# Patient Record
Sex: FEMALE | Race: WHITE | NOT HISPANIC OR LATINO | Employment: UNEMPLOYED | URBAN - METROPOLITAN AREA
[De-identification: names, ages, dates, MRNs, and addresses within clinical notes are randomized per-mention and may not be internally consistent; named-entity substitution may affect disease eponyms.]

---

## 2018-11-14 ENCOUNTER — TRANSCRIBE ORDERS (OUTPATIENT)
Dept: LAB | Facility: HOSPITAL | Age: 33
End: 2018-11-14

## 2018-11-14 ENCOUNTER — LAB (OUTPATIENT)
Dept: LAB | Facility: HOSPITAL | Age: 33
End: 2018-11-14

## 2018-11-14 DIAGNOSIS — Z32.00 PREGNANCY EXAMINATION OR TEST, PREGNANCY UNCONFIRMED: Primary | ICD-10-CM

## 2018-11-14 DIAGNOSIS — Z32.00 PREGNANCY EXAMINATION OR TEST, PREGNANCY UNCONFIRMED: ICD-10-CM

## 2018-11-14 LAB
ABO GROUP BLD: NORMAL
BASOPHILS # BLD AUTO: 0.04 10*3/MM3 (ref 0–0.2)
BASOPHILS NFR BLD AUTO: 0.3 % (ref 0–1)
BLD GP AB SCN SERPL QL: NEGATIVE
DEPRECATED RDW RBC AUTO: 44.4 FL (ref 37–54)
EOSINOPHIL # BLD AUTO: 0.08 10*3/MM3 (ref 0–0.3)
EOSINOPHIL NFR BLD AUTO: 0.6 % (ref 0–3)
ERYTHROCYTE [DISTWIDTH] IN BLOOD BY AUTOMATED COUNT: 13.6 % (ref 11.3–14.5)
GLUCOSE BLD-MCNC: 93 MG/DL (ref 70–100)
HCT VFR BLD AUTO: 40.7 % (ref 34.5–44)
HGB BLD-MCNC: 12.9 G/DL (ref 11.5–15.5)
IMM GRANULOCYTES # BLD: 0.03 10*3/MM3 (ref 0–0.03)
IMM GRANULOCYTES NFR BLD: 0.2 % (ref 0–0.6)
LYMPHOCYTES # BLD AUTO: 2.38 10*3/MM3 (ref 0.6–4.8)
LYMPHOCYTES NFR BLD AUTO: 19.3 % (ref 24–44)
MCH RBC QN AUTO: 28.4 PG (ref 27–31)
MCHC RBC AUTO-ENTMCNC: 31.7 G/DL (ref 32–36)
MCV RBC AUTO: 89.5 FL (ref 80–99)
MONOCYTES # BLD AUTO: 0.9 10*3/MM3 (ref 0–1)
MONOCYTES NFR BLD AUTO: 7.3 % (ref 0–12)
NEUTROPHILS # BLD AUTO: 8.89 10*3/MM3 (ref 1.5–8.3)
NEUTROPHILS NFR BLD AUTO: 72.3 % (ref 41–71)
PLATELET # BLD AUTO: 338 10*3/MM3 (ref 150–450)
PMV BLD AUTO: 9.9 FL (ref 6–12)
RBC # BLD AUTO: 4.55 10*6/MM3 (ref 3.89–5.14)
RH BLD: POSITIVE
WBC NRBC COR # BLD: 12.32 10*3/MM3 (ref 3.5–10.8)

## 2018-11-14 PROCEDURE — 86901 BLOOD TYPING SEROLOGIC RH(D): CPT

## 2018-11-14 PROCEDURE — 87522 HEPATITIS C REVRS TRNSCRPJ: CPT

## 2018-11-14 PROCEDURE — 86803 HEPATITIS C AB TEST: CPT

## 2018-11-14 PROCEDURE — 86850 RBC ANTIBODY SCREEN: CPT

## 2018-11-14 PROCEDURE — 85025 COMPLETE CBC W/AUTO DIFF WBC: CPT

## 2018-11-14 PROCEDURE — 82947 ASSAY GLUCOSE BLOOD QUANT: CPT | Performed by: OBSTETRICS & GYNECOLOGY

## 2018-11-14 PROCEDURE — 80081 OBSTETRIC PANEL INC HIV TSTG: CPT

## 2018-11-14 PROCEDURE — 86900 BLOOD TYPING SEROLOGIC ABO: CPT

## 2018-11-14 PROCEDURE — 36415 COLL VENOUS BLD VENIPUNCTURE: CPT | Performed by: OBSTETRICS & GYNECOLOGY

## 2018-11-15 LAB
HBV SURFACE AG SERPL QL IA: NORMAL
HCV AB SER DONR QL: REACTIVE
HIV1+2 AB SER QL: NORMAL
RUBV IGG SER QL: NORMAL
RUBV IGG SER-ACNC: 42.8 IU/ML

## 2018-11-16 LAB — RPR SER QL: NORMAL

## 2018-11-18 LAB
HCV RNA SERPL NAA+PROBE-ACNC: NORMAL IU/ML
TEST INFORMATION: NORMAL

## 2019-04-03 ENCOUNTER — LAB (OUTPATIENT)
Dept: LAB | Facility: HOSPITAL | Age: 34
End: 2019-04-03

## 2019-04-03 ENCOUNTER — TRANSCRIBE ORDERS (OUTPATIENT)
Dept: LAB | Facility: HOSPITAL | Age: 34
End: 2019-04-03

## 2019-04-03 DIAGNOSIS — Z34.83 PRENATAL CARE, SUBSEQUENT PREGNANCY, THIRD TRIMESTER: Primary | ICD-10-CM

## 2019-04-03 DIAGNOSIS — Z3A.28 28 WEEKS GESTATION OF PREGNANCY: ICD-10-CM

## 2019-04-03 DIAGNOSIS — Z34.83 PRENATAL CARE, SUBSEQUENT PREGNANCY, THIRD TRIMESTER: ICD-10-CM

## 2019-04-03 LAB
AMPHET+METHAMPHET UR QL: NEGATIVE
AMPHETAMINES UR QL: NEGATIVE
BARBITURATES UR QL SCN: NEGATIVE
BENZODIAZ UR QL SCN: NEGATIVE
BLD GP AB SCN SERPL QL: NEGATIVE
BUPRENORPHINE SERPL-MCNC: NEGATIVE NG/ML
CANNABINOIDS SERPL QL: NEGATIVE
COCAINE UR QL: NEGATIVE
DEPRECATED RDW RBC AUTO: 43.3 FL (ref 37–54)
ERYTHROCYTE [DISTWIDTH] IN BLOOD BY AUTOMATED COUNT: 13.6 % (ref 11.3–14.5)
GLUCOSE 1H P 100 G GLC PO SERPL-MCNC: 157 MG/DL (ref 65–140)
HCT VFR BLD AUTO: 34.5 % (ref 34.5–44)
HGB BLD-MCNC: 10.9 G/DL (ref 11.5–15.5)
MCH RBC QN AUTO: 27.5 PG (ref 27–31)
MCHC RBC AUTO-ENTMCNC: 31.6 G/DL (ref 32–36)
MCV RBC AUTO: 86.9 FL (ref 80–99)
METHADONE UR QL SCN: NEGATIVE
OPIATES UR QL: NEGATIVE
OXYCODONE UR QL SCN: NEGATIVE
PCP UR QL SCN: NEGATIVE
PLATELET # BLD AUTO: 309 10*3/MM3 (ref 150–450)
PMV BLD AUTO: 10.4 FL (ref 6–12)
PROPOXYPH UR QL: NEGATIVE
RBC # BLD AUTO: 3.97 10*6/MM3 (ref 3.89–5.14)
TRICYCLICS UR QL SCN: NEGATIVE
WBC NRBC COR # BLD: 11.31 10*3/MM3 (ref 3.5–10.8)

## 2019-04-03 PROCEDURE — 82950 GLUCOSE TEST: CPT | Performed by: OBSTETRICS & GYNECOLOGY

## 2019-04-03 PROCEDURE — 80306 DRUG TEST PRSMV INSTRMNT: CPT

## 2019-04-03 PROCEDURE — 36415 COLL VENOUS BLD VENIPUNCTURE: CPT | Performed by: OBSTETRICS & GYNECOLOGY

## 2019-04-03 PROCEDURE — 85027 COMPLETE CBC AUTOMATED: CPT | Performed by: OBSTETRICS & GYNECOLOGY

## 2019-04-03 PROCEDURE — 86850 RBC ANTIBODY SCREEN: CPT

## 2019-05-08 ENCOUNTER — OFFICE VISIT (OUTPATIENT)
Dept: ENDOCRINOLOGY | Facility: CLINIC | Age: 34
End: 2019-05-08

## 2019-05-08 VITALS
SYSTOLIC BLOOD PRESSURE: 132 MMHG | DIASTOLIC BLOOD PRESSURE: 78 MMHG | WEIGHT: 266.8 LBS | OXYGEN SATURATION: 99 % | HEART RATE: 82 BPM

## 2019-05-08 DIAGNOSIS — O24.419 GESTATIONAL DIABETES MELLITUS (GDM) IN THIRD TRIMESTER, GESTATIONAL DIABETES METHOD OF CONTROL UNSPECIFIED: Primary | ICD-10-CM

## 2019-05-08 LAB
GLUCOSE BLDC GLUCOMTR-MCNC: 127 MG/DL (ref 70–130)
HBA1C MFR BLD: 5.7 %

## 2019-05-08 PROCEDURE — 99243 OFF/OP CNSLTJ NEW/EST LOW 30: CPT | Performed by: INTERNAL MEDICINE

## 2019-05-08 PROCEDURE — 82947 ASSAY GLUCOSE BLOOD QUANT: CPT | Performed by: INTERNAL MEDICINE

## 2019-05-08 PROCEDURE — 83036 HEMOGLOBIN GLYCOSYLATED A1C: CPT | Performed by: INTERNAL MEDICINE

## 2019-05-08 RX ORDER — LANCETS
EACH MISCELLANEOUS
Qty: 100 EACH | Refills: 5 | Status: SHIPPED | OUTPATIENT
Start: 2019-05-08

## 2019-05-08 RX ORDER — RANITIDINE 150 MG/1
150 TABLET ORAL 2 TIMES DAILY
Refills: 0 | COMMUNITY
Start: 2019-04-15 | End: 2019-06-19 | Stop reason: HOSPADM

## 2019-05-08 RX ORDER — ONDANSETRON 8 MG/1
8 TABLET, ORALLY DISINTEGRATING ORAL EVERY 6 HOURS PRN
Refills: 0 | COMMUNITY
Start: 2019-03-17 | End: 2019-06-19 | Stop reason: HOSPADM

## 2019-05-08 RX ORDER — CITALOPRAM 20 MG/1
20 TABLET ORAL DAILY
Refills: 0 | COMMUNITY
Start: 2019-04-17

## 2019-05-08 RX ORDER — ALBUTEROL SULFATE 90 UG/1
AEROSOL, METERED RESPIRATORY (INHALATION)
Refills: 0 | COMMUNITY
Start: 2019-03-27 | End: 2019-05-08

## 2019-05-08 NOTE — ASSESSMENT & PLAN NOTE
She will begin testing sugars fasting and 2 hours after meals and will fax blood sugars weekly to [email or web address]  We will plan to see her back in 3-4 weeks, or sooner if problems or questions.  Thank you for this referral and please do not hesitate to call for any problems or questions.

## 2019-05-08 NOTE — PROGRESS NOTES
Fatuma Barnes 34 y.o.  CC:  Chief Complaint   Patient presents with   • Establish Care     The patient is being seen at the request of LUIS Sanderson for a consultation regarding gestational diabetes. ASHLEY:2019     [place]: Establish Care (The patient is being seen at the request of LUIS Sanderson for a consultation regarding gestational diabetes. ASHLEY:2019)  blood sugars reviewed  Results for orders placed or performed in visit on 19   POC Glycosylated Hemoglobin (Hb A1C)   Result Value Ref Range    Hemoglobin A1C 5.7 %   POCT Glucose   Result Value Ref Range    Glucose 127 70 - 130 mg/dL     Gtt reviewed with patient.   Average sugar is marginal and possibility of prediabetes discussed  Her  is diabetic as well  Great grandparents + h/o diabetes   Is  with GDM with middle child as well ( 5 lb 6 oz delivered at 37 weeks)  She is currently 33 weeks pregnant   We discussed the diagnosis of gestational diabetes and reviewed her glucose levels/ glucose tolerance test.  We discussed the concept of carbohydrate awaredness and carbohydrate content of meals was discussed.  Impact of sweets and other carb containing foods and types of foods typically high in carbohydrates was discussed. Overall guidelines for meal planning related to specific carbohydrate content of meals was discussed and she was provided recommendations about carbohydrates recommended with meals and with snacks.  She was asked to give up any sugared drinks, and avoid breakfast cereal.  Blood sugar testing fasting and after each meal was reviewed and the patient was taught to use a glucometer to test her blood sugar.  Normal values for blood sugar monitoring were discussed as well, with fasting level less than 95, 1 hour pp blood sugar less than 140 and 2 hour blood sugar less than 120 recommended.  Risks related to poor control of blood sugars during pregnancy were discussed with a focus on specific risks to both her and the baby.   Risks discussed include macrosomia (large birthweight), fetal lung immaturity with respiratory distress and low oxygen level, stillbirth, low blood sugar after delivery, high bilirubin/jaundice, and hypocalcemia.  Use of medications during pregnancy (eg metformin, glyburide and insulin) was discussed.  Her long term risks (outside of pregnancy) for development of Diabetes Mellitus were reviewed and we discussed the importance of healthy lifestyle now and in the future to help reduce the risk of progression to diabetes.      Allergies   Allergen Reactions   • Penicillins Rash       Current Outpatient Medications:   •  glucose blood (ONETOUCH VERIO) test strip, One strip 4 times daily to check blood sugars, Disp: 200 each, Rfl: 5  •  Lancets (ONETOUCH ULTRASOFT) lancets, Use to check blood sugars 4 times daily, Disp: 100 each, Rfl: 5  •  ondansetron ODT (ZOFRAN-ODT) 8 MG disintegrating tablet, , Disp: , Rfl: 0  •  raNITIdine (ZANTAC) 150 MG tablet, , Disp: , Rfl: 0  •  Urine Glucose-Ketones Test strip, 1 strip by In Vitro route Daily., Disp: 30 each, Rfl: 3  •  citalopram (CeleXA) 20 MG tablet, Take 20 mg by mouth Daily., Disp: , Rfl: 0  There are no active problems to display for this patient.    Review of Systems   Constitutional: Positive for fatigue. Negative for activity change, appetite change, chills, diaphoresis, fever and unexpected weight change.   HENT: Negative for congestion, dental problem, drooling, ear discharge, ear pain, facial swelling, hearing loss, mouth sores, nosebleeds, postnasal drip, rhinorrhea, sinus pressure, sneezing, sore throat, tinnitus, trouble swallowing and voice change.    Eyes: Negative for photophobia, pain, discharge, redness, itching and visual disturbance.   Respiratory: Negative for apnea, cough, choking, chest tightness, shortness of breath, wheezing and stridor.    Cardiovascular: Negative for chest pain, palpitations and leg swelling.   Gastrointestinal: Negative for  abdominal distention, abdominal pain, anal bleeding, blood in stool, constipation, diarrhea, nausea, rectal pain and vomiting.        Gerd   Endocrine: Positive for heat intolerance and polydipsia. Negative for cold intolerance, polyphagia and polyuria.   Genitourinary: Negative for decreased urine volume, difficulty urinating, dysuria, enuresis, flank pain, frequency, genital sores, hematuria and urgency.   Musculoskeletal: Negative for arthralgias, back pain, gait problem, joint swelling, myalgias, neck pain and neck stiffness.   Skin: Negative for color change, pallor, rash and wound.   Allergic/Immunologic: Negative for environmental allergies, food allergies and immunocompromised state.   Neurological: Positive for weakness. Negative for dizziness, tremors, seizures, syncope, facial asymmetry, speech difficulty, light-headedness, numbness and headaches.   Hematological: Negative for adenopathy. Does not bruise/bleed easily.   Psychiatric/Behavioral: Negative for agitation, behavioral problems, confusion, decreased concentration, dysphoric mood, hallucinations, self-injury, sleep disturbance and suicidal ideas. The patient is not nervous/anxious and is not hyperactive.      Social History     Socioeconomic History   • Marital status:      Spouse name: Not on file   • Number of children: Not on file   • Years of education: Not on file   • Highest education level: Not on file   Tobacco Use   • Smoking status: Light Tobacco Smoker   • Smokeless tobacco: Never Used   Substance and Sexual Activity   • Alcohol use: No     Frequency: Never   • Drug use: No   • Sexual activity: Yes     Partners: Male     Birth control/protection: None     Family History   Problem Relation Age of Onset   • Hypertension Mother    • Hypertension Father      /78   Pulse 82   Wt 121 kg (266 lb 12.8 oz)   SpO2 99%   Breastfeeding? No   Physical Exam   Constitutional: She is oriented to person, place, and time. She appears  well-developed and well-nourished.   HENT:   Head: Normocephalic and atraumatic.   Nose: Nose normal.   Mouth/Throat: Oropharynx is clear and moist.   Eyes: Conjunctivae, EOM and lids are normal. Pupils are equal, round, and reactive to light.   Neck: Trachea normal and normal range of motion. Neck supple. Carotid bruit is not present. No tracheal deviation present. Thyromegaly present. No thyroid mass present.   Cardiovascular: Normal rate, regular rhythm, normal heart sounds and intact distal pulses. Exam reveals no gallop and no friction rub.   No murmur heard.  Pulmonary/Chest: Effort normal and breath sounds normal. No respiratory distress. She has no wheezes.   Musculoskeletal: Normal range of motion. She exhibits no edema or deformity.   Lymphadenopathy:     She has no cervical adenopathy.   Neurological: She is alert and oriented to person, place, and time. She has normal reflexes. She displays normal reflexes. No cranial nerve deficit.   Skin: Skin is warm and dry. No rash noted. No cyanosis or erythema. Nails show no clubbing.   Psychiatric: She has a normal mood and affect. Her speech is normal and behavior is normal. Judgment and thought content normal. Cognition and memory are normal.   Nursing note and vitals reviewed.    Results for orders placed or performed in visit on 04/03/19   Urine Drug Screen - Urine, Clean Catch   Result Value Ref Range    THC, Screen, Urine Negative Negative    Phencyclidine (PCP), Urine Negative Negative    Cocaine Screen, Urine Negative Negative    Methamphetamine, Ur Negative Negative    Opiate Screen Negative Negative    Amphetamine Screen, Urine Negative Negative    Benzodiazepine Screen, Urine Negative Negative    Tricyclic Antidepressants Screen Negative Negative    Methadone Screen, Urine Negative Negative    Barbiturates Screen, Urine Negative Negative    Oxycodone Screen, Urine Negative Negative    Propoxyphene Screen Negative Negative    Buprenorphine, Screen,  Urine Negative Negative   Antibody Screen   Result Value Ref Range    Antibody Screen Negative      Fatmua was seen today for establish care.    Diagnoses and all orders for this visit:    Gestational diabetes mellitus (GDM) in third trimester, gestational diabetes method of control unspecified  -     POC Glycosylated Hemoglobin (Hb A1C)  -     POCT Glucose    Other orders  -     Lancets (ONETOUCH ULTRASOFT) lancets; Use to check blood sugars 4 times daily  -     glucose blood (ONETOUCH VERIO) test strip; One strip 4 times daily to check blood sugars  -     Urine Glucose-Ketones Test strip; 1 strip by In Vitro route Daily.      Problem List Items Addressed This Visit        Endocrine    Gestational diabetes mellitus (GDM) in third trimester - Primary     She will begin testing sugars fasting and 2 hours after meals and will fax blood sugars weekly to [email or web address].  We will plan to see her back in 3-4 weeks, or sooner if problems or questions.  Thank you for this referral and please do not hesitate to call for any problems or questions.           Relevant Orders    POC Glycosylated Hemoglobin (Hb A1C) (Completed)    POCT Glucose (Completed)        Return in about 4 weeks (around 6/5/2019) for Recheck 30 min .    Shari Hollingsworth  Signed Claudia Mota MD

## 2019-05-15 ENCOUNTER — LAB (OUTPATIENT)
Dept: LAB | Facility: HOSPITAL | Age: 34
End: 2019-05-15

## 2019-05-15 ENCOUNTER — TELEPHONE (OUTPATIENT)
Dept: INTERNAL MEDICINE | Facility: CLINIC | Age: 34
End: 2019-05-15

## 2019-05-15 ENCOUNTER — TRANSCRIBE ORDERS (OUTPATIENT)
Dept: LAB | Facility: HOSPITAL | Age: 34
End: 2019-05-15

## 2019-05-15 DIAGNOSIS — O26.613 LIVER DISORDER IN PREGNANCY IN THIRD TRIMESTER: ICD-10-CM

## 2019-05-15 DIAGNOSIS — O26.613 LIVER DISORDER IN PREGNANCY IN THIRD TRIMESTER: Primary | ICD-10-CM

## 2019-05-15 LAB
ALP SERPL-CCNC: 145 U/L (ref 39–117)
ALT SERPL W P-5'-P-CCNC: 13 U/L (ref 1–33)
AST SERPL-CCNC: 14 U/L (ref 1–32)
BILIRUB SERPL-MCNC: <0.2 MG/DL (ref 0.2–1.2)
CREAT BLD-MCNC: 0.55 MG/DL (ref 0.57–1)
LDH SERPL-CCNC: 156 U/L (ref 135–214)
URATE SERPL-MCNC: 3.7 MG/DL (ref 2.4–5.7)

## 2019-05-15 PROCEDURE — 84075 ASSAY ALKALINE PHOSPHATASE: CPT | Performed by: ADVANCED PRACTICE MIDWIFE

## 2019-05-15 PROCEDURE — 36415 COLL VENOUS BLD VENIPUNCTURE: CPT | Performed by: ADVANCED PRACTICE MIDWIFE

## 2019-05-15 PROCEDURE — 82247 BILIRUBIN TOTAL: CPT | Performed by: ADVANCED PRACTICE MIDWIFE

## 2019-05-15 PROCEDURE — 82565 ASSAY OF CREATININE: CPT | Performed by: ADVANCED PRACTICE MIDWIFE

## 2019-05-15 PROCEDURE — 82239 BILE ACIDS TOTAL: CPT

## 2019-05-15 PROCEDURE — 84550 ASSAY OF BLOOD/URIC ACID: CPT | Performed by: ADVANCED PRACTICE MIDWIFE

## 2019-05-15 PROCEDURE — 84460 ALANINE AMINO (ALT) (SGPT): CPT | Performed by: ADVANCED PRACTICE MIDWIFE

## 2019-05-15 PROCEDURE — 84450 TRANSFERASE (AST) (SGOT): CPT | Performed by: ADVANCED PRACTICE MIDWIFE

## 2019-05-15 PROCEDURE — 83615 LACTATE (LD) (LDH) ENZYME: CPT | Performed by: ADVANCED PRACTICE MIDWIFE

## 2019-05-16 ENCOUNTER — TELEPHONE (OUTPATIENT)
Dept: INTERNAL MEDICINE | Facility: CLINIC | Age: 34
End: 2019-05-16

## 2019-05-16 NOTE — TELEPHONE ENCOUNTER
Pt called stating sugar levels, pt states she is 8 months pregnant. Please give pt a call [phone]

## 2019-05-17 ENCOUNTER — TELEPHONE (OUTPATIENT)
Dept: ENDOCRINOLOGY | Facility: CLINIC | Age: 34
End: 2019-05-17

## 2019-05-17 LAB — BILE AC SERPL-SCNC: 6.7 UMOL/L (ref 4.7–24.5)

## 2019-05-17 NOTE — TELEPHONE ENCOUNTER
Tried calling patient several times with no success will call POC and  attempt to call again later. Pt VM is not set up / no answer.

## 2019-05-17 NOTE — TELEPHONE ENCOUNTER
Dr Mota will email the patient and ask for updated blood sugars and will address medications if needed

## 2019-05-21 ENCOUNTER — TELEPHONE (OUTPATIENT)
Dept: ENDOCRINOLOGY | Facility: CLINIC | Age: 34
End: 2019-05-21

## 2019-05-21 NOTE — TELEPHONE ENCOUNTER
From Email- insulin start please call   Juanito Burris,   I think we need to start some insulin.  I have put a coupon for NPH insulin and samples of novolog for you to .  You will start 8 units of NPH insulin at bedtime and 4 units of novolog before each meal  You will need to buy a meter (I would get a [employer] or [employer] meter, they will be 15$ and the strips will be about 20-25$ a month)  Are you able to come by and pick this up today or tomorrow?  Thanks,     Claudia Mota MD  [facility] at [place]  [phone]  [secure email]    From: Fatuma Barnes <[email or web address]>   Sent: Monday, May 20, 2019 7:43 AM  To: Claudia Mota ([facility]) (MD) <[email or web address]>  Subject: Fatuma Barnes GD numbers    CAUTION: This email originated from outside of the organization. Do not click links or open attachments unless you recognize the sender and know the content is safe.    Good morning . I am sorry this is late coming in. I had a rough day yesterday and fell asleep early.     I have changed my diet the best that I can. I cut out pop and sweet drinks and when I do have carbs I try to make sure they are whole grain. My  lost his job and thing are tight right now, so I'm trying to do the best that I can on a small budget.    Also, I have started walking as well. Friday I had to  Lancets and test strips from our pharmacy that goes to my 's glucose meter. Insurance would not cover for the meter that was given to me at your office .That's why Friday is blank.    I have gotten a bit frustrated over my numbers being high. I have tried to call your office and speak to a nurse 3 days in a row with no luck.    Lastly, I might have Cholestasis. I had it with my 2 previous pregnancies. I'm not sure if that has anything to do with my numbers, but I was just wanting to let you know.    Thank you and have a good week !

## 2019-05-31 ENCOUNTER — HOSPITAL ENCOUNTER (OUTPATIENT)
Facility: HOSPITAL | Age: 34
Discharge: HOME | End: 2019-05-31
Attending: OBSTETRICS & GYNECOLOGY | Admitting: OBSTETRICS & GYNECOLOGY

## 2019-05-31 ENCOUNTER — TELEPHONE (OUTPATIENT)
Dept: INTERNAL MEDICINE | Facility: CLINIC | Age: 34
End: 2019-05-31

## 2019-05-31 VITALS
WEIGHT: 267 LBS | DIASTOLIC BLOOD PRESSURE: 78 MMHG | TEMPERATURE: 98.4 F | HEIGHT: 68 IN | BODY MASS INDEX: 40.47 KG/M2 | HEART RATE: 93 BPM | SYSTOLIC BLOOD PRESSURE: 131 MMHG | RESPIRATION RATE: 18 BRPM

## 2019-05-31 PROCEDURE — 59025 FETAL NON-STRESS TEST: CPT

## 2019-05-31 PROCEDURE — 59025 FETAL NON-STRESS TEST: CPT | Performed by: OBSTETRICS & GYNECOLOGY

## 2019-05-31 PROCEDURE — 99202 OFFICE O/P NEW SF 15 MIN: CPT | Performed by: OBSTETRICS & GYNECOLOGY

## 2019-05-31 PROCEDURE — G0463 HOSPITAL OUTPT CLINIC VISIT: HCPCS

## 2019-06-14 ENCOUNTER — PREP FOR SURGERY (OUTPATIENT)
Dept: OTHER | Facility: HOSPITAL | Age: 34
End: 2019-06-14

## 2019-06-14 DIAGNOSIS — Z3A.39 39 WEEKS GESTATION OF PREGNANCY: Primary | ICD-10-CM

## 2019-06-14 RX ORDER — PENICILLIN G 3000000 [IU]/50ML
3 INJECTION, SOLUTION INTRAVENOUS EVERY 4 HOURS
Status: CANCELLED | OUTPATIENT
Start: 2019-06-14 | End: 2019-06-18

## 2019-06-14 RX ORDER — ONDANSETRON 4 MG/1
4 TABLET, FILM COATED ORAL EVERY 6 HOURS PRN
Status: CANCELLED | OUTPATIENT
Start: 2019-06-14

## 2019-06-14 RX ORDER — LIDOCAINE HYDROCHLORIDE 10 MG/ML
5 INJECTION, SOLUTION EPIDURAL; INFILTRATION; INTRACAUDAL; PERINEURAL AS NEEDED
Status: CANCELLED | OUTPATIENT
Start: 2019-06-14

## 2019-06-14 RX ORDER — SODIUM CHLORIDE 0.9 % (FLUSH) 0.9 %
3 SYRINGE (ML) INJECTION EVERY 12 HOURS SCHEDULED
Status: CANCELLED | OUTPATIENT
Start: 2019-06-14

## 2019-06-14 RX ORDER — OXYTOCIN-SODIUM CHLORIDE 0.9% IV SOLN 30 UNIT/500ML 30-0.9/5 UT/ML-%
85 SOLUTION INTRAVENOUS ONCE
Status: CANCELLED | OUTPATIENT
Start: 2019-06-14 | End: 2019-06-14

## 2019-06-14 RX ORDER — OXYTOCIN-SODIUM CHLORIDE 0.9% IV SOLN 30 UNIT/500ML 30-0.9/5 UT/ML-%
650 SOLUTION INTRAVENOUS ONCE
Status: CANCELLED | OUTPATIENT
Start: 2019-06-14 | End: 2019-06-14

## 2019-06-14 RX ORDER — ACETAMINOPHEN 325 MG/1
650 TABLET ORAL EVERY 4 HOURS PRN
Status: CANCELLED | OUTPATIENT
Start: 2019-06-14

## 2019-06-14 RX ORDER — MISOPROSTOL 200 UG/1
800 TABLET ORAL AS NEEDED
Status: CANCELLED | OUTPATIENT
Start: 2019-06-14

## 2019-06-14 RX ORDER — SODIUM CHLORIDE 0.9 % (FLUSH) 0.9 %
1-10 SYRINGE (ML) INJECTION AS NEEDED
Status: CANCELLED | OUTPATIENT
Start: 2019-06-14

## 2019-06-14 RX ORDER — BUTORPHANOL TARTRATE 1 MG/ML
1 INJECTION, SOLUTION INTRAMUSCULAR; INTRAVENOUS
Status: CANCELLED | OUTPATIENT
Start: 2019-06-14

## 2019-06-14 RX ORDER — IBUPROFEN 600 MG/1
600 TABLET ORAL EVERY 6 HOURS PRN
Status: CANCELLED | OUTPATIENT
Start: 2019-06-14

## 2019-06-14 RX ORDER — HYDROCODONE BITARTRATE AND ACETAMINOPHEN 5; 325 MG/1; MG/1
2 TABLET ORAL EVERY 4 HOURS PRN
Status: CANCELLED | OUTPATIENT
Start: 2019-06-14 | End: 2019-06-24

## 2019-06-14 RX ORDER — MAGNESIUM CARB/ALUMINUM HYDROX 105-160MG
30 TABLET,CHEWABLE ORAL ONCE
Status: CANCELLED | OUTPATIENT
Start: 2019-06-14 | End: 2019-06-14

## 2019-06-14 RX ORDER — CARBOPROST TROMETHAMINE 250 UG/ML
250 INJECTION, SOLUTION INTRAMUSCULAR AS NEEDED
Status: CANCELLED | OUTPATIENT
Start: 2019-06-14

## 2019-06-14 RX ORDER — ONDANSETRON 2 MG/ML
4 INJECTION INTRAMUSCULAR; INTRAVENOUS EVERY 6 HOURS PRN
Status: CANCELLED | OUTPATIENT
Start: 2019-06-14

## 2019-06-14 RX ORDER — METHYLERGONOVINE MALEATE 0.2 MG/ML
200 INJECTION INTRAVENOUS ONCE AS NEEDED
Status: CANCELLED | OUTPATIENT
Start: 2019-06-14

## 2019-06-14 RX ORDER — SODIUM CHLORIDE, SODIUM LACTATE, POTASSIUM CHLORIDE, CALCIUM CHLORIDE 600; 310; 30; 20 MG/100ML; MG/100ML; MG/100ML; MG/100ML
125 INJECTION, SOLUTION INTRAVENOUS CONTINUOUS
Status: CANCELLED | OUTPATIENT
Start: 2019-06-14

## 2019-06-16 ENCOUNTER — HOSPITAL ENCOUNTER (INPATIENT)
Dept: LABOR AND DELIVERY | Facility: HOSPITAL | Age: 34
LOS: 3 days | Discharge: HOME | End: 2019-06-19
Attending: OBSTETRICS & GYNECOLOGY | Admitting: OBSTETRICS & GYNECOLOGY

## 2019-06-16 PROBLEM — Z34.90 PATIENT CURRENTLY PREGNANT: Status: ACTIVE | Noted: 2019-06-16

## 2019-06-16 LAB
ABO GROUP BLD: NORMAL
ALP SERPL-CCNC: 317 U/L (ref 39–117)
ALT SERPL W P-5'-P-CCNC: 10 U/L (ref 1–33)
AST SERPL-CCNC: 16 U/L (ref 1–32)
BILIRUB SERPL-MCNC: 0.2 MG/DL (ref 0.2–1.2)
BLD GP AB SCN SERPL QL: NEGATIVE
CREAT BLD-MCNC: 0.64 MG/DL (ref 0.57–1)
DEPRECATED RDW RBC AUTO: 40.9 FL (ref 37–54)
ERYTHROCYTE [DISTWIDTH] IN BLOOD BY AUTOMATED COUNT: 14 % (ref 12.3–15.4)
GLUCOSE BLDC GLUCOMTR-MCNC: 135 MG/DL (ref 70–130)
GLUCOSE BLDC GLUCOMTR-MCNC: 72 MG/DL (ref 70–130)
GLUCOSE BLDC GLUCOMTR-MCNC: 77 MG/DL (ref 70–130)
HCT VFR BLD AUTO: 34.1 % (ref 34–46.6)
HGB BLD-MCNC: 10.5 G/DL (ref 12–15.9)
LDH SERPL-CCNC: 138 U/L (ref 135–214)
MCH RBC QN AUTO: 25.2 PG (ref 26.6–33)
MCHC RBC AUTO-ENTMCNC: 30.8 G/DL (ref 31.5–35.7)
MCV RBC AUTO: 81.8 FL (ref 79–97)
PLATELET # BLD AUTO: 252 10*3/MM3 (ref 140–450)
PMV BLD AUTO: 11.7 FL (ref 6–12)
RBC # BLD AUTO: 4.17 10*6/MM3 (ref 3.77–5.28)
RH BLD: POSITIVE
T&S EXPIRATION DATE: NORMAL
URATE SERPL-MCNC: 5.7 MG/DL (ref 2.4–5.7)
WBC NRBC COR # BLD: 12.04 10*3/MM3 (ref 3.4–10.8)

## 2019-06-16 PROCEDURE — 82247 BILIRUBIN TOTAL: CPT | Performed by: NURSE PRACTITIONER

## 2019-06-16 PROCEDURE — 84075 ASSAY ALKALINE PHOSPHATASE: CPT | Performed by: NURSE PRACTITIONER

## 2019-06-16 PROCEDURE — 86901 BLOOD TYPING SEROLOGIC RH(D): CPT | Performed by: NURSE PRACTITIONER

## 2019-06-16 PROCEDURE — 82962 GLUCOSE BLOOD TEST: CPT

## 2019-06-16 PROCEDURE — 25010000002 ONDANSETRON PER 1 MG: Performed by: NURSE PRACTITIONER

## 2019-06-16 PROCEDURE — 25010000003 CEFAZOLIN IN DEXTROSE 2-4 GM/100ML-% SOLUTION: Performed by: NURSE PRACTITIONER

## 2019-06-16 PROCEDURE — 86850 RBC ANTIBODY SCREEN: CPT | Performed by: NURSE PRACTITIONER

## 2019-06-16 PROCEDURE — 84550 ASSAY OF BLOOD/URIC ACID: CPT | Performed by: NURSE PRACTITIONER

## 2019-06-16 PROCEDURE — 84460 ALANINE AMINO (ALT) (SGPT): CPT | Performed by: NURSE PRACTITIONER

## 2019-06-16 PROCEDURE — 86900 BLOOD TYPING SEROLOGIC ABO: CPT | Performed by: NURSE PRACTITIONER

## 2019-06-16 PROCEDURE — 82565 ASSAY OF CREATININE: CPT | Performed by: NURSE PRACTITIONER

## 2019-06-16 PROCEDURE — 59025 FETAL NON-STRESS TEST: CPT

## 2019-06-16 PROCEDURE — 86920 COMPATIBILITY TEST SPIN: CPT

## 2019-06-16 PROCEDURE — 84450 TRANSFERASE (AST) (SGOT): CPT | Performed by: NURSE PRACTITIONER

## 2019-06-16 PROCEDURE — 25010000002 BUTORPHANOL PER 1 MG: Performed by: NURSE PRACTITIONER

## 2019-06-16 PROCEDURE — 85027 COMPLETE CBC AUTOMATED: CPT | Performed by: NURSE PRACTITIONER

## 2019-06-16 PROCEDURE — 83615 LACTATE (LD) (LDH) ENZYME: CPT | Performed by: NURSE PRACTITIONER

## 2019-06-16 RX ORDER — CEFAZOLIN SODIUM 2 G/100ML
2 INJECTION, SOLUTION INTRAVENOUS ONCE
Status: COMPLETED | OUTPATIENT
Start: 2019-06-16 | End: 2019-06-16

## 2019-06-16 RX ORDER — PROMETHAZINE HYDROCHLORIDE 25 MG/ML
12.5 INJECTION, SOLUTION INTRAMUSCULAR; INTRAVENOUS EVERY 6 HOURS PRN
Status: DISCONTINUED | OUTPATIENT
Start: 2019-06-16 | End: 2019-06-17

## 2019-06-16 RX ORDER — ONDANSETRON 4 MG/1
4 TABLET, FILM COATED ORAL EVERY 6 HOURS PRN
Status: DISCONTINUED | OUTPATIENT
Start: 2019-06-16 | End: 2019-06-17

## 2019-06-16 RX ORDER — SODIUM CHLORIDE, SODIUM LACTATE, POTASSIUM CHLORIDE, CALCIUM CHLORIDE 600; 310; 30; 20 MG/100ML; MG/100ML; MG/100ML; MG/100ML
125 INJECTION, SOLUTION INTRAVENOUS CONTINUOUS
Status: DISCONTINUED | OUTPATIENT
Start: 2019-06-16 | End: 2019-06-17

## 2019-06-16 RX ORDER — ACETAMINOPHEN 325 MG/1
650 TABLET ORAL EVERY 4 HOURS PRN
Status: DISCONTINUED | OUTPATIENT
Start: 2019-06-16 | End: 2019-06-17

## 2019-06-16 RX ORDER — SODIUM CHLORIDE 0.9 % (FLUSH) 0.9 %
3-10 SYRINGE (ML) INJECTION AS NEEDED
Status: DISCONTINUED | OUTPATIENT
Start: 2019-06-16 | End: 2019-06-17

## 2019-06-16 RX ORDER — PRENATAL WITH FERROUS FUM AND FOLIC ACID 3080; 920; 120; 400; 22; 1.84; 3; 20; 10; 1; 12; 200; 27; 25; 2 [IU]/1; [IU]/1; MG/1; [IU]/1; MG/1; MG/1; MG/1; MG/1; MG/1; MG/1; UG/1; MG/1; MG/1; MG/1; MG/1
1 TABLET ORAL DAILY
COMMUNITY

## 2019-06-16 RX ORDER — OXYTOCIN-SODIUM CHLORIDE 0.9% IV SOLN 30 UNIT/500ML 30-0.9/5 UT/ML-%
2-20 SOLUTION INTRAVENOUS
Status: DISCONTINUED | OUTPATIENT
Start: 2019-06-16 | End: 2019-06-19 | Stop reason: HOSPADM

## 2019-06-16 RX ORDER — MAGNESIUM SULF/RINGERS LACTATE 40 G/500ML
2 PLASTIC BAG, INJECTION (ML) INTRAVENOUS CONTINUOUS
Status: DISCONTINUED | OUTPATIENT
Start: 2019-06-17 | End: 2019-06-19 | Stop reason: HOSPADM

## 2019-06-16 RX ORDER — LABETALOL HYDROCHLORIDE 5 MG/ML
20-80 INJECTION, SOLUTION INTRAVENOUS
Status: ACTIVE | OUTPATIENT
Start: 2019-06-16 | End: 2019-06-17

## 2019-06-16 RX ORDER — PROMETHAZINE HYDROCHLORIDE 12.5 MG/1
12.5 SUPPOSITORY RECTAL EVERY 6 HOURS PRN
Status: DISCONTINUED | OUTPATIENT
Start: 2019-06-16 | End: 2019-06-17

## 2019-06-16 RX ORDER — LIDOCAINE HYDROCHLORIDE 10 MG/ML
5 INJECTION, SOLUTION EPIDURAL; INFILTRATION; INTRACAUDAL; PERINEURAL AS NEEDED
Status: DISCONTINUED | OUTPATIENT
Start: 2019-06-16 | End: 2019-06-17

## 2019-06-16 RX ORDER — TERBUTALINE SULFATE 1 MG/ML
0.25 INJECTION, SOLUTION SUBCUTANEOUS AS NEEDED
Status: DISCONTINUED | OUTPATIENT
Start: 2019-06-16 | End: 2019-06-17

## 2019-06-16 RX ORDER — ONDANSETRON 2 MG/ML
4 INJECTION INTRAMUSCULAR; INTRAVENOUS EVERY 6 HOURS PRN
Status: DISCONTINUED | OUTPATIENT
Start: 2019-06-16 | End: 2019-06-17

## 2019-06-16 RX ORDER — PROMETHAZINE HYDROCHLORIDE 12.5 MG/1
12.5 TABLET ORAL EVERY 6 HOURS PRN
Status: DISCONTINUED | OUTPATIENT
Start: 2019-06-16 | End: 2019-06-17

## 2019-06-16 RX ORDER — MAGNESIUM CARB/ALUMINUM HYDROX 105-160MG
30 TABLET,CHEWABLE ORAL ONCE
Status: DISCONTINUED | OUTPATIENT
Start: 2019-06-16 | End: 2019-06-17

## 2019-06-16 RX ORDER — CEFAZOLIN SODIUM 1 G/50ML
1 INJECTION, SOLUTION INTRAVENOUS EVERY 8 HOURS
Status: DISCONTINUED | OUTPATIENT
Start: 2019-06-17 | End: 2019-06-17

## 2019-06-16 RX ORDER — SODIUM CHLORIDE 0.9 % (FLUSH) 0.9 %
3 SYRINGE (ML) INJECTION EVERY 12 HOURS SCHEDULED
Status: DISCONTINUED | OUTPATIENT
Start: 2019-06-16 | End: 2019-06-17

## 2019-06-16 RX ORDER — BUTORPHANOL TARTRATE 1 MG/ML
1 INJECTION, SOLUTION INTRAMUSCULAR; INTRAVENOUS
Status: DISCONTINUED | OUTPATIENT
Start: 2019-06-16 | End: 2019-06-17

## 2019-06-16 RX ADMIN — SODIUM CHLORIDE, POTASSIUM CHLORIDE, SODIUM LACTATE AND CALCIUM CHLORIDE 125 ML/HR: 600; 310; 30; 20 INJECTION, SOLUTION INTRAVENOUS at 16:25

## 2019-06-16 RX ADMIN — BUTORPHANOL TARTRATE 2 MG: 2 INJECTION, SOLUTION INTRAMUSCULAR; INTRAVENOUS at 21:54

## 2019-06-16 RX ADMIN — ONDANSETRON 4 MG: 2 INJECTION INTRAMUSCULAR; INTRAVENOUS at 23:48

## 2019-06-16 RX ADMIN — OXYTOCIN 2 MILLI-UNITS/MIN: 10 INJECTION, SOLUTION INTRAMUSCULAR; INTRAVENOUS at 20:30

## 2019-06-16 RX ADMIN — BUTORPHANOL TARTRATE 2 MG: 2 INJECTION, SOLUTION INTRAMUSCULAR; INTRAVENOUS at 17:13

## 2019-06-16 RX ADMIN — CEFAZOLIN SODIUM 2 G: 2 INJECTION, SOLUTION INTRAVENOUS at 16:47

## 2019-06-16 RX ADMIN — BUTORPHANOL TARTRATE 2 MG: 2 INJECTION, SOLUTION INTRAMUSCULAR; INTRAVENOUS at 19:24

## 2019-06-16 RX ADMIN — LABETALOL HYDROCHLORIDE 20 MG: 5 INJECTION INTRAVENOUS at 23:30

## 2019-06-17 ENCOUNTER — ANESTHESIA EVENT (OUTPATIENT)
Dept: LABOR AND DELIVERY | Facility: HOSPITAL | Age: 34
End: 2019-06-17

## 2019-06-17 ENCOUNTER — ANESTHESIA (OUTPATIENT)
Dept: LABOR AND DELIVERY | Facility: HOSPITAL | Age: 34
End: 2019-06-17

## 2019-06-17 PROBLEM — Z34.90 PATIENT CURRENTLY PREGNANT: Status: RESOLVED | Noted: 2019-06-16 | Resolved: 2019-06-17

## 2019-06-17 LAB
APTT PPP: 26.4 SECONDS (ref 24–37)
BASOPHILS # BLD AUTO: 0.06 10*3/MM3 (ref 0–0.2)
BASOPHILS NFR BLD AUTO: 0.4 % (ref 0–1.5)
DEPRECATED RDW RBC AUTO: 42.7 FL (ref 37–54)
DEPRECATED RDW RBC AUTO: 42.7 FL (ref 37–54)
DEPRECATED RDW RBC AUTO: 43.1 FL (ref 37–54)
EOSINOPHIL # BLD AUTO: 0.09 10*3/MM3 (ref 0–0.4)
EOSINOPHIL NFR BLD AUTO: 0.7 % (ref 0.3–6.2)
ERYTHROCYTE [DISTWIDTH] IN BLOOD BY AUTOMATED COUNT: 14.1 % (ref 12.3–15.4)
ERYTHROCYTE [DISTWIDTH] IN BLOOD BY AUTOMATED COUNT: 14.2 % (ref 12.3–15.4)
ERYTHROCYTE [DISTWIDTH] IN BLOOD BY AUTOMATED COUNT: 14.2 % (ref 12.3–15.4)
FIBRINOGEN PPP-MCNC: 650 MG/DL (ref 220–400)
GLUCOSE BLDC GLUCOMTR-MCNC: 115 MG/DL (ref 70–130)
GLUCOSE BLDC GLUCOMTR-MCNC: 117 MG/DL (ref 70–130)
GLUCOSE BLDC GLUCOMTR-MCNC: 99 MG/DL (ref 70–130)
HCT VFR BLD AUTO: 29.2 % (ref 34–46.6)
HCT VFR BLD AUTO: 31.2 % (ref 34–46.6)
HCT VFR BLD AUTO: 31.2 % (ref 34–46.6)
HGB BLD-MCNC: 8.8 G/DL (ref 12–15.9)
HGB BLD-MCNC: 9.5 G/DL (ref 12–15.9)
HGB BLD-MCNC: 9.5 G/DL (ref 12–15.9)
IMM GRANULOCYTES # BLD AUTO: 0.05 10*3/MM3 (ref 0–0.05)
IMM GRANULOCYTES NFR BLD AUTO: 0.4 % (ref 0–0.5)
INR PPP: 1.1 (ref 0.85–1.16)
LYMPHOCYTES # BLD AUTO: 1.71 10*3/MM3 (ref 0.7–3.1)
LYMPHOCYTES NFR BLD AUTO: 12.6 % (ref 19.6–45.3)
MCH RBC QN AUTO: 25.3 PG (ref 26.6–33)
MCH RBC QN AUTO: 25.3 PG (ref 26.6–33)
MCH RBC QN AUTO: 25.4 PG (ref 26.6–33)
MCHC RBC AUTO-ENTMCNC: 30.1 G/DL (ref 31.5–35.7)
MCHC RBC AUTO-ENTMCNC: 30.4 G/DL (ref 31.5–35.7)
MCHC RBC AUTO-ENTMCNC: 30.4 G/DL (ref 31.5–35.7)
MCV RBC AUTO: 83.2 FL (ref 79–97)
MCV RBC AUTO: 83.4 FL (ref 79–97)
MCV RBC AUTO: 83.9 FL (ref 79–97)
MONOCYTES # BLD AUTO: 1.08 10*3/MM3 (ref 0.1–0.9)
MONOCYTES NFR BLD AUTO: 7.9 % (ref 5–12)
NEUTROPHILS # BLD AUTO: 10.6 10*3/MM3 (ref 1.7–7)
NEUTROPHILS NFR BLD AUTO: 78 % (ref 42.7–76)
NRBC BLD AUTO-RTO: 0 /100 WBC (ref 0–0.2)
PLATELET # BLD AUTO: 261 10*3/MM3 (ref 140–450)
PLATELET # BLD AUTO: 262 10*3/MM3 (ref 140–450)
PLATELET # BLD AUTO: 291 10*3/MM3 (ref 140–450)
PMV BLD AUTO: 12.1 FL (ref 6–12)
PMV BLD AUTO: 12.2 FL (ref 6–12)
PMV BLD AUTO: 12.3 FL (ref 6–12)
PROTHROMBIN TIME: 13.7 SECONDS (ref 11.2–14.3)
RBC # BLD AUTO: 3.48 10*6/MM3 (ref 3.77–5.28)
RBC # BLD AUTO: 3.74 10*6/MM3 (ref 3.77–5.28)
RBC # BLD AUTO: 3.75 10*6/MM3 (ref 3.77–5.28)
WBC NRBC COR # BLD: 13.4 10*3/MM3 (ref 3.4–10.8)
WBC NRBC COR # BLD: 13.59 10*3/MM3 (ref 3.4–10.8)
WBC NRBC COR # BLD: 19.3 10*3/MM3 (ref 3.4–10.8)

## 2019-06-17 PROCEDURE — 85025 COMPLETE CBC W/AUTO DIFF WBC: CPT | Performed by: OBSTETRICS & GYNECOLOGY

## 2019-06-17 PROCEDURE — 59025 FETAL NON-STRESS TEST: CPT

## 2019-06-17 PROCEDURE — 51703 INSERT BLADDER CATH COMPLEX: CPT | Performed by: NURSE PRACTITIONER

## 2019-06-17 PROCEDURE — C1755 CATHETER, INTRASPINAL: HCPCS | Performed by: ANESTHESIOLOGY

## 2019-06-17 PROCEDURE — 82962 GLUCOSE BLOOD TEST: CPT

## 2019-06-17 PROCEDURE — 25010000002 ROPIVACAINE PER 1 MG: Performed by: NURSE ANESTHETIST, CERTIFIED REGISTERED

## 2019-06-17 PROCEDURE — 25010000002 PROMETHAZINE PER 50 MG: Performed by: OBSTETRICS & GYNECOLOGY

## 2019-06-17 PROCEDURE — 85027 COMPLETE CBC AUTOMATED: CPT | Performed by: OBSTETRICS & GYNECOLOGY

## 2019-06-17 PROCEDURE — 85384 FIBRINOGEN ACTIVITY: CPT | Performed by: OBSTETRICS & GYNECOLOGY

## 2019-06-17 PROCEDURE — 85730 THROMBOPLASTIN TIME PARTIAL: CPT | Performed by: OBSTETRICS & GYNECOLOGY

## 2019-06-17 PROCEDURE — 25010000002 FENTANYL CITRATE (PF) 100 MCG/2ML SOLUTION: Performed by: NURSE ANESTHETIST, CERTIFIED REGISTERED

## 2019-06-17 PROCEDURE — 25010000003 CEFAZOLIN IN DEXTROSE 2-4 GM/100ML-% SOLUTION: Performed by: OBSTETRICS & GYNECOLOGY

## 2019-06-17 PROCEDURE — C1755 CATHETER, INTRASPINAL: HCPCS | Performed by: NURSE PRACTITIONER

## 2019-06-17 PROCEDURE — 85610 PROTHROMBIN TIME: CPT | Performed by: OBSTETRICS & GYNECOLOGY

## 2019-06-17 PROCEDURE — 0HQ9XZZ REPAIR PERINEUM SKIN, EXTERNAL APPROACH: ICD-10-PCS | Performed by: OBSTETRICS & GYNECOLOGY

## 2019-06-17 PROCEDURE — 25010000002 BUTORPHANOL PER 1 MG: Performed by: NURSE PRACTITIONER

## 2019-06-17 PROCEDURE — 25010000003 CEFAZOLIN 1-4 GM/50ML-% SOLUTION: Performed by: NURSE PRACTITIONER

## 2019-06-17 PROCEDURE — 25010000002 MAGNESIUM SULFATE-LACT RINGERS 40 GM/580ML SOLUTION: Performed by: NURSE PRACTITIONER

## 2019-06-17 PROCEDURE — 25010000002 HYDROMORPHONE 1 MG/ML SOLUTION: Performed by: OBSTETRICS & GYNECOLOGY

## 2019-06-17 PROCEDURE — 59025 FETAL NON-STRESS TEST: CPT | Performed by: NURSE PRACTITIONER

## 2019-06-17 RX ORDER — PROMETHAZINE HYDROCHLORIDE 25 MG/ML
12.5 INJECTION, SOLUTION INTRAMUSCULAR; INTRAVENOUS EVERY 6 HOURS PRN
Status: DISCONTINUED | OUTPATIENT
Start: 2019-06-17 | End: 2019-06-19 | Stop reason: HOSPADM

## 2019-06-17 RX ORDER — CEFAZOLIN SODIUM 2 G/100ML
2 INJECTION, SOLUTION INTRAVENOUS ONCE
Status: COMPLETED | OUTPATIENT
Start: 2019-06-17 | End: 2019-06-17

## 2019-06-17 RX ORDER — OXYTOCIN-SODIUM CHLORIDE 0.9% IV SOLN 30 UNIT/500ML 30-0.9/5 UT/ML-%
85 SOLUTION INTRAVENOUS ONCE
Status: COMPLETED | OUTPATIENT
Start: 2019-06-17 | End: 2019-06-17

## 2019-06-17 RX ORDER — ONDANSETRON 2 MG/ML
4 INJECTION INTRAMUSCULAR; INTRAVENOUS EVERY 6 HOURS PRN
Status: DISCONTINUED | OUTPATIENT
Start: 2019-06-17 | End: 2019-06-19 | Stop reason: HOSPADM

## 2019-06-17 RX ORDER — DOCUSATE SODIUM 100 MG/1
100 CAPSULE, LIQUID FILLED ORAL DAILY
Status: DISCONTINUED | OUTPATIENT
Start: 2019-06-17 | End: 2019-06-19 | Stop reason: HOSPADM

## 2019-06-17 RX ORDER — DIPHENHYDRAMINE HYDROCHLORIDE 50 MG/ML
12.5 INJECTION INTRAMUSCULAR; INTRAVENOUS EVERY 8 HOURS PRN
Status: DISCONTINUED | OUTPATIENT
Start: 2019-06-17 | End: 2019-06-17

## 2019-06-17 RX ORDER — PROMETHAZINE HYDROCHLORIDE 12.5 MG/1
12.5 SUPPOSITORY RECTAL EVERY 6 HOURS PRN
Status: DISCONTINUED | OUTPATIENT
Start: 2019-06-17 | End: 2019-06-19 | Stop reason: HOSPADM

## 2019-06-17 RX ORDER — CARBOPROST TROMETHAMINE 250 UG/ML
250 INJECTION, SOLUTION INTRAMUSCULAR AS NEEDED
Status: DISCONTINUED | OUTPATIENT
Start: 2019-06-17 | End: 2019-06-18 | Stop reason: HOSPADM

## 2019-06-17 RX ORDER — LIDOCAINE HYDROCHLORIDE AND EPINEPHRINE 15; 5 MG/ML; UG/ML
INJECTION, SOLUTION EPIDURAL AS NEEDED
Status: DISCONTINUED | OUTPATIENT
Start: 2019-06-17 | End: 2019-06-17 | Stop reason: SURG

## 2019-06-17 RX ORDER — ONDANSETRON 4 MG/1
4 TABLET, FILM COATED ORAL EVERY 6 HOURS PRN
Status: DISCONTINUED | OUTPATIENT
Start: 2019-06-17 | End: 2019-06-19 | Stop reason: HOSPADM

## 2019-06-17 RX ORDER — ONDANSETRON 2 MG/ML
4 INJECTION INTRAMUSCULAR; INTRAVENOUS ONCE AS NEEDED
Status: DISCONTINUED | OUTPATIENT
Start: 2019-06-17 | End: 2019-06-17

## 2019-06-17 RX ORDER — IBUPROFEN 600 MG/1
600 TABLET ORAL EVERY 6 HOURS PRN
Status: DISCONTINUED | OUTPATIENT
Start: 2019-06-17 | End: 2019-06-17

## 2019-06-17 RX ORDER — IBUPROFEN 600 MG/1
600 TABLET ORAL EVERY 6 HOURS PRN
Status: DISCONTINUED | OUTPATIENT
Start: 2019-06-17 | End: 2019-06-19 | Stop reason: HOSPADM

## 2019-06-17 RX ORDER — PROMETHAZINE HYDROCHLORIDE 25 MG/1
25 TABLET ORAL EVERY 6 HOURS PRN
Status: DISCONTINUED | OUTPATIENT
Start: 2019-06-17 | End: 2019-06-19 | Stop reason: HOSPADM

## 2019-06-17 RX ORDER — BISACODYL 10 MG
10 SUPPOSITORY, RECTAL RECTAL DAILY PRN
Status: DISCONTINUED | OUTPATIENT
Start: 2019-06-18 | End: 2019-06-19 | Stop reason: HOSPADM

## 2019-06-17 RX ORDER — SODIUM CHLORIDE 0.9 % (FLUSH) 0.9 %
1-10 SYRINGE (ML) INJECTION AS NEEDED
Status: DISCONTINUED | OUTPATIENT
Start: 2019-06-17 | End: 2019-06-19 | Stop reason: HOSPADM

## 2019-06-17 RX ORDER — PROMETHAZINE HYDROCHLORIDE 25 MG/ML
12.5 INJECTION, SOLUTION INTRAMUSCULAR; INTRAVENOUS ONCE
Status: COMPLETED | OUTPATIENT
Start: 2019-06-17 | End: 2019-06-17

## 2019-06-17 RX ORDER — OXYTOCIN-SODIUM CHLORIDE 0.9% IV SOLN 30 UNIT/500ML 30-0.9/5 UT/ML-%
650 SOLUTION INTRAVENOUS ONCE
Status: COMPLETED | OUTPATIENT
Start: 2019-06-17 | End: 2019-06-17

## 2019-06-17 RX ORDER — EPHEDRINE SULFATE/0.9% NACL/PF 25 MG/5 ML
10 SYRINGE (ML) INTRAVENOUS
Status: DISCONTINUED | OUTPATIENT
Start: 2019-06-17 | End: 2019-06-17

## 2019-06-17 RX ORDER — OXYTOCIN 10 [USP'U]/ML
10 INJECTION, SOLUTION INTRAMUSCULAR; INTRAVENOUS ONCE
Status: COMPLETED | OUTPATIENT
Start: 2019-06-17 | End: 2019-06-17

## 2019-06-17 RX ORDER — MISOPROSTOL 200 UG/1
800 TABLET ORAL AS NEEDED
Status: DISCONTINUED | OUTPATIENT
Start: 2019-06-17 | End: 2019-06-18 | Stop reason: HOSPADM

## 2019-06-17 RX ORDER — TRISODIUM CITRATE DIHYDRATE AND CITRIC ACID MONOHYDRATE 500; 334 MG/5ML; MG/5ML
30 SOLUTION ORAL ONCE
Status: DISCONTINUED | OUTPATIENT
Start: 2019-06-17 | End: 2019-06-17

## 2019-06-17 RX ORDER — METHYLERGONOVINE MALEATE 0.2 MG/ML
200 INJECTION INTRAVENOUS ONCE AS NEEDED
Status: DISCONTINUED | OUTPATIENT
Start: 2019-06-17 | End: 2019-06-18 | Stop reason: HOSPADM

## 2019-06-17 RX ORDER — DEXTROSE AND SODIUM CHLORIDE 5; .2 G/100ML; G/100ML
96 INJECTION, SOLUTION INTRAVENOUS CONTINUOUS
Status: DISCONTINUED | OUTPATIENT
Start: 2019-06-17 | End: 2019-06-19 | Stop reason: HOSPADM

## 2019-06-17 RX ORDER — SODIUM CHLORIDE 0.9 % (FLUSH) 0.9 %
3 SYRINGE (ML) INJECTION EVERY 12 HOURS SCHEDULED
Status: DISCONTINUED | OUTPATIENT
Start: 2019-06-17 | End: 2019-06-19 | Stop reason: HOSPADM

## 2019-06-17 RX ORDER — SODIUM CHLORIDE 0.9 % (FLUSH) 0.9 %
3-10 SYRINGE (ML) INJECTION AS NEEDED
Status: DISCONTINUED | OUTPATIENT
Start: 2019-06-17 | End: 2019-06-19 | Stop reason: HOSPADM

## 2019-06-17 RX ORDER — METOCLOPRAMIDE HYDROCHLORIDE 5 MG/ML
10 INJECTION INTRAMUSCULAR; INTRAVENOUS ONCE AS NEEDED
Status: DISCONTINUED | OUTPATIENT
Start: 2019-06-17 | End: 2019-06-17

## 2019-06-17 RX ORDER — ACETAMINOPHEN 500 MG
1000 TABLET ORAL ONCE
Status: COMPLETED | OUTPATIENT
Start: 2019-06-17 | End: 2019-06-17

## 2019-06-17 RX ORDER — FENTANYL CITRATE 50 UG/ML
INJECTION, SOLUTION INTRAMUSCULAR; INTRAVENOUS AS NEEDED
Status: DISCONTINUED | OUTPATIENT
Start: 2019-06-17 | End: 2019-06-17 | Stop reason: SURG

## 2019-06-17 RX ORDER — LANOLIN 100 %
OINTMENT (GRAM) TOPICAL
Status: DISCONTINUED | OUTPATIENT
Start: 2019-06-17 | End: 2019-06-19 | Stop reason: HOSPADM

## 2019-06-17 RX ORDER — ACETAMINOPHEN 325 MG/1
650 TABLET ORAL EVERY 4 HOURS PRN
Status: DISCONTINUED | OUTPATIENT
Start: 2019-06-17 | End: 2019-06-19 | Stop reason: HOSPADM

## 2019-06-17 RX ORDER — HYDROCODONE BITARTRATE AND ACETAMINOPHEN 5; 325 MG/1; MG/1
1 TABLET ORAL EVERY 4 HOURS PRN
Status: DISCONTINUED | OUTPATIENT
Start: 2019-06-17 | End: 2019-06-19 | Stop reason: HOSPADM

## 2019-06-17 RX ADMIN — OXYTOCIN 500 ML/HR: 10 INJECTION INTRAVENOUS at 10:20

## 2019-06-17 RX ADMIN — CEFAZOLIN SODIUM 1 G: 1 INJECTION, SOLUTION INTRAVENOUS at 03:05

## 2019-06-17 RX ADMIN — CARBOPROST TROMETHAMINE 250 MCG: 250 INJECTION, SOLUTION INTRAMUSCULAR at 09:56

## 2019-06-17 RX ADMIN — DOCUSATE SODIUM 100 MG: 100 CAPSULE, LIQUID FILLED ORAL at 20:21

## 2019-06-17 RX ADMIN — Medication 15 ML/HR: at 08:35

## 2019-06-17 RX ADMIN — PROMETHAZINE HYDROCHLORIDE 12.5 MG: 25 INJECTION INTRAMUSCULAR; INTRAVENOUS at 11:45

## 2019-06-17 RX ADMIN — LIDOCAINE HYDROCHLORIDE AND EPINEPHRINE 3 ML: 15; 5 INJECTION, SOLUTION EPIDURAL at 08:24

## 2019-06-17 RX ADMIN — CARBOPROST TROMETHAMINE 250 MCG: 250 INJECTION, SOLUTION INTRAMUSCULAR at 12:10

## 2019-06-17 RX ADMIN — HYDROCODONE BITARTRATE AND ACETAMINOPHEN 1 TABLET: 5; 325 TABLET ORAL at 13:59

## 2019-06-17 RX ADMIN — LIDOCAINE HYDROCHLORIDE AND EPINEPHRINE 2 ML: 15; 5 INJECTION, SOLUTION EPIDURAL at 08:27

## 2019-06-17 RX ADMIN — BUTORPHANOL TARTRATE 2 MG: 2 INJECTION, SOLUTION INTRAMUSCULAR; INTRAVENOUS at 04:50

## 2019-06-17 RX ADMIN — BUTORPHANOL TARTRATE 2 MG: 2 INJECTION, SOLUTION INTRAMUSCULAR; INTRAVENOUS at 02:29

## 2019-06-17 RX ADMIN — HYDROCODONE BITARTRATE AND ACETAMINOPHEN 1 TABLET: 5; 325 TABLET ORAL at 18:10

## 2019-06-17 RX ADMIN — OXYTOCIN 1500 ML/HR: 10 INJECTION INTRAVENOUS at 09:55

## 2019-06-17 RX ADMIN — FENTANYL CITRATE 100 MCG: 50 INJECTION, SOLUTION INTRAMUSCULAR; INTRAVENOUS at 08:27

## 2019-06-17 RX ADMIN — HYDROCODONE BITARTRATE AND ACETAMINOPHEN 1 TABLET: 5; 325 TABLET ORAL at 22:24

## 2019-06-17 RX ADMIN — CEFAZOLIN SODIUM 2 G: 2 INJECTION, SOLUTION INTRAVENOUS at 12:39

## 2019-06-17 RX ADMIN — DEXTROSE AND SODIUM CHLORIDE 96 ML/HR: 5; 200 INJECTION, SOLUTION INTRAVENOUS at 20:24

## 2019-06-17 RX ADMIN — HYDROMORPHONE HYDROCHLORIDE 1 MG: 1 INJECTION, SOLUTION INTRAMUSCULAR; INTRAVENOUS; SUBCUTANEOUS at 11:45

## 2019-06-17 RX ADMIN — ACETAMINOPHEN 650 MG: 325 TABLET ORAL at 20:21

## 2019-06-17 RX ADMIN — OXYTOCIN 10 UNITS: 10 INJECTION, SOLUTION INTRAMUSCULAR; INTRAVENOUS at 10:48

## 2019-06-17 RX ADMIN — ACETAMINOPHEN 1000 MG: 500 TABLET, FILM COATED ORAL at 06:28

## 2019-06-17 RX ADMIN — ROPIVACAINE HYDROCHLORIDE 10 ML: 5 INJECTION, SOLUTION EPIDURAL; INFILTRATION; PERINEURAL at 08:32

## 2019-06-17 RX ADMIN — MISOPROSTOL 800 MCG: 200 TABLET ORAL at 10:45

## 2019-06-17 RX ADMIN — MAGNESIUM SULFATE HEPTAHYDRATE 2 G/HR: 500 INJECTION, SOLUTION INTRAMUSCULAR; INTRAVENOUS at 22:34

## 2019-06-17 NOTE — ANESTHESIA PREPROCEDURE EVALUATION
Anesthesia Evaluation     Patient summary reviewed and Nursing notes reviewed   NPO Solid Status: > 8 hours  NPO Liquid Status: < 2 hours           Airway   Mallampati: II  TM distance: >3 FB  Neck ROM: full  Dental      Pulmonary - negative pulmonary ROS   Cardiovascular - negative cardio ROS        Neuro/Psych  (+) psychiatric history,     GI/Hepatic/Renal/Endo    (+) morbid obesity, GERD,  hepatitis (Resolved) C, liver disease, diabetes mellitus gestational,     Musculoskeletal (-) negative ROS    Abdominal    Substance History - negative use     OB/GYN    (+) Pregnant,         Other - negative ROS                     Anesthesia Plan    ASA 3     epidural     Anesthetic plan, all risks, benefits, and alternatives have been provided, discussed and informed consent has been obtained with: patient.

## 2019-06-17 NOTE — ANESTHESIA PROCEDURE NOTES
Labor Epidural      Patient reassessed immediately prior to procedure    Patient location during procedure: floor  Performed By  Anesthesiologist: Gabriel Gibson MD  CRNA: Alla Bradshaw CRNA  Preanesthetic Checklist  Completed: patient identified, surgical consent, pre-op evaluation, timeout performed, IV checked, risks and benefits discussed and monitors and equipment checked  Prep:  Pt Position:sitting  Sterile Tech:cap, gloves, mask and sterile barrier  Prep:DuraPrep  Monitoring:blood pressure monitoring  Epidural Block Procedure:  Approach:midline  Guidance:palpation technique  Location:L4-L5  Needle Type:Tuohy  Needle Gauge:17 G  Loss of Resistance Medium: air  Loss of Resistance: 7cm  Cath Depth at skin:12 cm  Paresthesia: none  Aspiration:negative  Test Dose:negative  Number of Attempts: 1  Post Assessment:  Dressing:occlusive dressing applied and secured with tape  Pt Tolerance:patient tolerated the procedure well with no apparent complications  Complications:no

## 2019-06-17 NOTE — PLAN OF CARE
Problem: Labor (Cervical Ripen, Induct, Augment) (Adult,Obstetrics,Pediatric)  Goal: Signs and Symptoms of Listed Potential Problems Will be Absent, Minimized or Managed (Labor)  Outcome: Ongoing (interventions implemented as appropriate)   06/17/19 1552   Goal/Outcome Evaluation   Problems Assessed (Labor) all   Problems Present (Labor) none

## 2019-06-17 NOTE — L&D DELIVERY NOTE
[facility]  Vaginal Delivery Note  39 3/7 weks  GDMA2:  Insulin controlled  Chronic Hypertension  Delivery     Delivery: Vaginal, Spontaneous     YOB: 2020    Time of Birth:  Gestational Age 9:48 AM   39w3d     Anesthesia: Epidural     Delivering clinician: Dennise Sanderson    Forceps?   No   Vacuum? No    Shoulder dystocia present: No        Delivery narrative:  Fatuma pushed to deliver over a working epidural a male  at 39 3/7 weeks in OA presentation. There was one nuchal cord that was reduced before delivery of shoulders and body.  was placed on Maternal abdomen. The cord was milked 5 times. The cord was doubly clamped and cut by FOB.  The nose and mouth was suctioned with suction bulb.  There was one tiny perineal laceration that was repaired with a 3-0 vicryl with good hemostasis noted.  The placenta delivered visually intact. There was a postpartum hemorrhage. This was controlled uterine massage, one dose of hemobate and pitocin. EBL 1000ml    Infant    Findings: male  infant     Infant observations: Weight: 3530 g (7 lb 12.5 oz)   Length: 19.5  in  Observations/Comments:         Apgars: 7   @ 1 minute /    9   @ 5 minutes   Infant Name: Matthias     Placenta, Cord, and Fluid    Placenta delivered  Spontaneous  at   2019  9:54 AM     Cord: 3 vessels  present.   Nuchal Cord?  yes; Number of nuchal loops present:  1    Cord blood obtained: Yes                  Repair    Episiotomy: None    Lacerations: Yes  Laceration Information  Laceration Repaired?   Perineal: 1st  Yes    Periurethral:         Labial:         Sulcus:         Vaginal: No       Cervical: No          Suture used for repair: 3-0 Vicryl   Estimated Blood Loss: Est. Blood Loss (mL): 1000 mL(Filed from Delivery Summary) (19 4784)           Complications  none    Disposition  Mother to Mother Baby/Postpartum  in stable condition currently.  Baby to remains with mom  in stable condition currently.      Dennise GRANADOS  DO Kin  06/17/19  10:21 AM

## 2019-06-17 NOTE — PROGRESS NOTES
[facility]  Obstetric Progress Note    Subjective     Patient:    Resting without complaints. Denies HA, vision changes and epigastric pain    Objective     Vital Signs Range for the last 24 hours  Temp:  [98.8 °F (37.1 °C)-98.9 °F (37.2 °C)] 98.9 °F (37.2 °C)   Temp src: Oral   BP: (133-171)/(69-96) 137/69   Heart Rate:  [78-99] 84   Resp:  [16-18] 16               Weight:  [122 kg (269 lb)] 122 kg (269 lb)       Intake/Output this shift:    No intake/output data recorded.    Physical Exam:      Abdomen Abdominal exam: soft, nontender, nondistended, no masses or organomegaly.   Extremities Exam of extremities: peripheral pulses normal, no pedal edema, no clubbing or cyanosis     Presentation: vertex   Cervix: Exam by: Method: sterile exam per RN   Dilation:  4   Effacement: Cervical Effacement: 50%   Station:           Fetal Heart Rate Assessment   Method: Fetal HR Assessment Method: external   Beats/min: Fetal HR (beats/min): 130   Baseline: Fetal Heart Baseline Rate: normal range   Varibility: Fetal HR Variability: moderate (amplitude range 6 to 25 bpm)   Accels: Fetal HR Accelerations: greater than/equal to 15 bpm, lasting at least 15 seconds   Decels: Fetal HR Decelerations: absent   Tracing Category:       Uterine Assessment   Method: Method: external tocotransducer   Frequency (min):     Ctx Count in 10 min:     Duration:     Intensity: Contraction Intensity: mild by palpation   Intensity by IUPC:     Resting Tone: Uterine Resting Tone: soft by palpation   Resting Tone by IUPC:     Ulises Units:         Assessment/Plan       Patient currently pregnant        Assessment:  1.  Intrauterine pregnancy at 39w2d weeks gestation with reactive fetal status.    2.  labor  with ROM  3.  Severe range blood pressures noted at times    Plan:  1. Continue observation  2.  Repeat SVE every 2-4 hours or prn  3. If continued elevation of blood pressures will start IV magnesium and labetalol protocol  3.   Plan of care  has been reviewed with patient and she verbalizes understanding  4.  Risks, benefits of treatment plan have been discussed.  5.  All questions have been answered.        Daisy Eagle CNM  6/16/2019  11:49 PM

## 2019-06-17 NOTE — PROGRESS NOTES
Laborist    Called to see patient regarding continued vaginal bleeding    Patient seen and examined.  Patient alert oriented x3 no acute distress  All signs stable afebrile rate 104 pulse ox 90% on room air  H/H 9.5/31.2  Post delivery stable    Explored several golf ball size clots expressed.  This is third time to express clots.  Discussed with patient would recommend to do a curettage and possible Bakri balloon.  Discussed this patient methodology risk.  Patient agreed form was obtained.    Patient was placed in dorsal time position.  The lip the cervix was grasped with a ring forcep  Using a [employer] curette the uterus curetted with no dental fragments.  Only small clots.  She was given a second dose of Hemabate.  You should notice increased bleeding more than usual.  A Bakri balloon was placed and infused balloon to 350 mL's of saline.  Intolerant procedure well.    IMP  Primary postpartum hemorrhage atony   Hemodynamically stable currently    Plan  1.  Catheter in place watching I's and O's  2.  Second IV placed 2nd IV, coag, monitor closely

## 2019-06-17 NOTE — H&P
FANTA Salazar  Obstetric History and Physical    No chief complaint on file.      Subjective     Patient is a 34 y.o. female  currently at 39w2d, who presents with spontaneous rupture of membranes    Her prenatal care is significant for A2 gestational diabetes, +GBS, +hepatitis C.  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 Her past medical history is significant for morbid obesity     The following portions of the patients history were reviewed and updated as appropriate: current medications, allergies, past medical history, past surgical history, past family history, past social history and problem list .       Prenatal Information:  Prenatal Results     Initial Prenatal Labs     Test Value Reference Range Date Time    Hemoglobin 12.9 g/dL 11.5 - 15.5 g/dL 18 1042    Hematocrit 40.7 % 34.5 - 44.0 % 18 1042    Platelets 252 10*3/mm3 140 - 450 10*3/mm3 19 1632    Rubella IgG 42.8 IU/mL >=5.0 IU/mL 18 1042      Immune  Immune 18 1042    Hepatitis B SAg Non-Reactive  Non-Reactive 18 1042    Hepatitis C Ab Reactive  Non-Reactive 18 1042    RPR Non-Reactive  Non-Reactive 18 1042    ABO A   19 1633    Rh Positive   19 1633    Antibody Screen Negative   18 1042    HIV Non-Reactive  Non-Reactive 18 1042    Urine Culture        Gonorrhea        Chlamydia        TSH              2nd and 3rd Trimester     Test Value Reference Range Date Time    Hemoglobin (repeated) 10.5 g/dL 12.0 - 15.9 g/dL 19 1632    Hematocrit (repeated) 34.1 % 34.0 - 46.6 % 19 1632     mg/dL 65 - 140 mg/dL 19 1139    Antibody Screen (repeated) Negative   19 1633    GTT Fasting        GTT 1 Hr        GTT 2 Hr        GTT 3 Hr        Group B Strep              Drug Screening     Test Value Reference Range Date Time    Amphetamine Screen Negative  Negative 19 1139    Barbiturate Screen Negative   Negative 04/03/19 1139    Benzodiazepine Screen Negative  Negative 04/03/19 1139    Methadone Screen Negative  Negative 04/03/19 1139    Phencyclidine Screen Negative  Negative 04/03/19 1139    Opiates Screen Negative  Negative 04/03/19 1139    THC Screen Negative  Negative 04/03/19 1139    Cocaine Screen Negative  Negative 04/03/19 1139    Propoxyphene Screen Negative  Negative 04/03/19 1139    Buprenorphine Screen Negative  Negative 04/03/19 1139    Methamphetamine Screen Negative  Negative 04/03/19 1139    Oxycodone Screen Negative  Negative 04/03/19 1139    Tricyclic Antidepressants Screen Negative  Negative 04/03/19 1139          Other (Risk screening)     Test Value Reference Range Date Time    Varicella IgG        Parvovirus IgG        CMV IgG        Cystic Fibrosis        Hemoglobin electrophoresis        NIPT        MSAFP-4        AFP (for NTD only)                  External Prenatal Results     Pregnancy Outside Results - Transcribed From Office Records - See Scanned Records For Details     Test Value Date Time    Hgb 10.5 g/dL 06/16/19 1632    Hct 34.1 % 06/16/19 1632    ABO A  06/16/19 1633    Rh Positive  06/16/19 1633    Antibody Screen Negative  06/16/19 1633    Glucose Fasting GTT       Glucose Tolerance Test 1 hour       Glucose Tolerance Test 3 hour       Gonorrhea (discrete)       Chlamydia (discrete)       RPR Non-Reactive  11/14/18 1042    VDRL       Syphilis Antibody       Rubella 42.8 IU/mL 11/14/18 1042      Immune  11/14/18 1042    HBsAg Non-Reactive  11/14/18 1042    Herpes Simplex Virus PCR       Herpes Simplex VIrus Culture       HIV Non-Reactive  11/14/18 1042    Hep C RNA Quant PCR       Hep C Antibody Reactive  11/14/18 1042    AFP       Group B Strep       GBS Susceptibility to Clindamycin       GBS Susceptibility to Erythromycin       Fetal Fibronectin       Genetic Testing, Maternal Blood             Drug Screening     Test Value Date Time    Urine Drug Screen       Amphetamine  Screen Negative  19 1139    Barbiturate Screen Negative  19 1139    Benzodiazepine Screen Negative  19 1139    Methadone Screen Negative  19 1139    Phencyclidine Screen Negative  19 1139    Opiates Screen Negative  19 1139    THC Screen Negative  19 1139    Cocaine Screen       Propoxyphene Screen Negative  19 1139    Buprenorphine Screen Negative  19 1139    Methamphetamine Screen       Oxycodone Screen Negative  19 1139    Tricyclic Antidepressants Screen Negative  19 1139                 Past OB History:     Obstetric History       T2      L2     SAB1   TAB0   Ectopic0   Molar0   Multiple0   Live Births2       # Outcome Date GA Lbr Myron/2nd Weight Sex Delivery Anes PTL Lv   4 Current            3 Term  37w0d  2948 g (6 lb 8 oz) M Vag-Spont EPI N CAMERON      Complications: Gestational diabetes   2 SAB            1 Term  38w0d  2948 g (6 lb 8 oz) M Vag-Spont EPI N CAMERON          Past Medical History: Past Medical History:   Diagnosis Date   • Depression    • GERD (gastroesophageal reflux disease)    • Hepatitis C    • Kidney stones       Past Surgical History Past Surgical History:   Procedure Laterality Date   • D&C AND LAPAROSCOPY     • KIDNEY STONE SURGERY        Family History: Family History   Problem Relation Age of Onset   • Hypertension Mother    • Hypertension Father       Social History:  reports that she has been smoking electronic cigarette.  She has never used smokeless tobacco.   reports that she does not drink alcohol.   reports that she does not use drugs.        Review of Systems   Constitutional: Negative.    HENT: Negative.    Eyes: Negative.    Respiratory: Negative.    Cardiovascular: Negative.    Gastrointestinal: Negative.    Endocrine: Negative.    Genitourinary: Negative.    Musculoskeletal: Negative.    Skin: Negative.    Allergic/Immunologic: Negative.    Neurological: Negative.    Hematological:  Negative.    Psychiatric/Behavioral: Negative.          Objective     Vital Signs Range for the last 24 hours  Temperature: Temp:  [98.3 °F (36.8 °C)-98.9 °F (37.2 °C)] 98.3 °F (36.8 °C)   Temp Source: Temp src: Oral   BP: BP: (128-171)/(63-96) 128/63   Pulse: Heart Rate:  [78-99] 78   Respirations: Resp:  [16-18] 16   SPO2:     O2 Amount (l/min):     O2 Devices     Weight: Weight:  [122 kg (269 lb)] 122 kg (269 lb)     Physical Examination: General appearance - alert, well appearing, and in no distress  Neck - supple, no significant adenopathy  Chest - clear to auscultation, no wheezes, rales or rhonchi, symmetric air entry  Heart - normal rate, regular rhythm, normal S1, S2, no murmurs, rubs, clicks or gallops  Abdomen - soft, nontender, nondistended, no masses or organomegaly  Extremities - peripheral pulses normal, no pedal edema, no clubbing or cyanosis    Presentation: vertex   Cervix: Exam by:   RN   Dilation: Cervical Dilation (cm): 3-4   Effacement: Cervical Effacement: 50%   Station:       Fetal Heart Rate Assessment   Method: Fetal HR Assessment Method: external   Beats/min: Fetal HR (beats/min): 130   Baseline: Fetal Heart Baseline Rate: normal range   Variability: Fetal HR Variability: moderate (amplitude range 6 to 25 bpm)   Accels: Fetal HR Accelerations: greater than/equal to 15 bpm, lasting at least 15 seconds   Decels: Fetal HR Decelerations: absent   Tracing Category:       Uterine Assessment   Method: Method: external tocotransducer   Frequency (min):     Ctx Count in 10 min:     Duration:     Intensity: Contraction Intensity: mild by palpation   Intensity by IUPC:     Resting Tone: Uterine Resting Tone: soft by palpation   Resting Tone by IUPC:     [place] Units:       Assessment/Plan       Patient currently pregnant      Assessment & Plan    Assessment:  1.  Intrauterine pregnancy at 39w2d gestation with reactive fetal status.    2.  labor  with ROM  3.  A2 GDM on insulin  4. Morbid  obesity  5. Reacitve hepatitis C antibody    Plan:  1. fetal and uterine monitoring  continuously and expectant management  2. Plan of care has been reviewed with patient and she verbalizes understanding  3.  Risks, benefits of treatment plan have been discussed.  4.  All questions have been answered.  5.  Epidural as desires      Daisy Eagle CNM  6/16/2019  11:52 PM

## 2019-06-17 NOTE — PROGRESS NOTES
Laborist    CTSP re increased vaginal bleeding recent postpartum vaginal delivery    Uterus explored small amount of clots obtained uterus firm well below the umbilicus no remaining products of conception noted.  Perineum intact.    800 mcg of Cytotec given AR  H&H pending  Continue to watch patient for increased signs of hemorrhage

## 2019-06-18 LAB
ALP SERPL-CCNC: 192 U/L (ref 39–117)
ALT SERPL W P-5'-P-CCNC: 7 U/L (ref 1–33)
AST SERPL-CCNC: 16 U/L (ref 1–32)
BASOPHILS # BLD AUTO: 0.05 10*3/MM3 (ref 0–0.2)
BASOPHILS NFR BLD AUTO: 0.4 % (ref 0–1.5)
BILIRUB SERPL-MCNC: <0.2 MG/DL (ref 0.2–1.2)
CREAT BLD-MCNC: 0.66 MG/DL (ref 0.57–1)
DEPRECATED RDW RBC AUTO: 43.3 FL (ref 37–54)
DEPRECATED RDW RBC AUTO: 43.3 FL (ref 37–54)
EOSINOPHIL # BLD AUTO: 0.09 10*3/MM3 (ref 0–0.4)
EOSINOPHIL NFR BLD AUTO: 0.7 % (ref 0.3–6.2)
ERYTHROCYTE [DISTWIDTH] IN BLOOD BY AUTOMATED COUNT: 14.4 % (ref 12.3–15.4)
ERYTHROCYTE [DISTWIDTH] IN BLOOD BY AUTOMATED COUNT: 14.4 % (ref 12.3–15.4)
HCT VFR BLD AUTO: 19.1 % (ref 34–46.6)
HCT VFR BLD AUTO: 19.1 % (ref 34–46.6)
HGB BLD-MCNC: 5.9 G/DL (ref 12–15.9)
HGB BLD-MCNC: 5.9 G/DL (ref 12–15.9)
IMM GRANULOCYTES # BLD AUTO: 0.07 10*3/MM3 (ref 0–0.05)
IMM GRANULOCYTES NFR BLD AUTO: 0.5 % (ref 0–0.5)
LDH SERPL-CCNC: 239 U/L (ref 135–214)
LYMPHOCYTES # BLD AUTO: 3.29 10*3/MM3 (ref 0.7–3.1)
LYMPHOCYTES NFR BLD AUTO: 24.1 % (ref 19.6–45.3)
MCH RBC QN AUTO: 25.9 PG (ref 26.6–33)
MCH RBC QN AUTO: 25.9 PG (ref 26.6–33)
MCHC RBC AUTO-ENTMCNC: 30.9 G/DL (ref 31.5–35.7)
MCHC RBC AUTO-ENTMCNC: 30.9 G/DL (ref 31.5–35.7)
MCV RBC AUTO: 83.8 FL (ref 79–97)
MCV RBC AUTO: 83.8 FL (ref 79–97)
MONOCYTES # BLD AUTO: 0.99 10*3/MM3 (ref 0.1–0.9)
MONOCYTES NFR BLD AUTO: 7.3 % (ref 5–12)
NEUTROPHILS # BLD AUTO: 9.15 10*3/MM3 (ref 1.7–7)
NEUTROPHILS NFR BLD AUTO: 67 % (ref 42.7–76)
NRBC BLD AUTO-RTO: 0 /100 WBC (ref 0–0.2)
PLATELET # BLD AUTO: 259 10*3/MM3 (ref 140–450)
PLATELET # BLD AUTO: 259 10*3/MM3 (ref 140–450)
PMV BLD AUTO: 11.8 FL (ref 6–12)
PMV BLD AUTO: 11.8 FL (ref 6–12)
RBC # BLD AUTO: 2.28 10*6/MM3 (ref 3.77–5.28)
RBC # BLD AUTO: 2.28 10*6/MM3 (ref 3.77–5.28)
URATE SERPL-MCNC: 5.7 MG/DL (ref 2.4–5.7)
WBC NRBC COR # BLD: 13.64 10*3/MM3 (ref 3.4–10.8)
WBC NRBC COR # BLD: 13.64 10*3/MM3 (ref 3.4–10.8)

## 2019-06-18 PROCEDURE — 86900 BLOOD TYPING SEROLOGIC ABO: CPT

## 2019-06-18 PROCEDURE — 84460 ALANINE AMINO (ALT) (SGPT): CPT | Performed by: OBSTETRICS & GYNECOLOGY

## 2019-06-18 PROCEDURE — 84075 ASSAY ALKALINE PHOSPHATASE: CPT | Performed by: OBSTETRICS & GYNECOLOGY

## 2019-06-18 PROCEDURE — 85027 COMPLETE CBC AUTOMATED: CPT | Performed by: OBSTETRICS & GYNECOLOGY

## 2019-06-18 PROCEDURE — 84450 TRANSFERASE (AST) (SGOT): CPT | Performed by: OBSTETRICS & GYNECOLOGY

## 2019-06-18 PROCEDURE — 36430 TRANSFUSION BLD/BLD COMPNT: CPT

## 2019-06-18 PROCEDURE — 84550 ASSAY OF BLOOD/URIC ACID: CPT | Performed by: OBSTETRICS & GYNECOLOGY

## 2019-06-18 PROCEDURE — 83615 LACTATE (LD) (LDH) ENZYME: CPT | Performed by: OBSTETRICS & GYNECOLOGY

## 2019-06-18 PROCEDURE — 82247 BILIRUBIN TOTAL: CPT | Performed by: OBSTETRICS & GYNECOLOGY

## 2019-06-18 PROCEDURE — 82565 ASSAY OF CREATININE: CPT | Performed by: OBSTETRICS & GYNECOLOGY

## 2019-06-18 PROCEDURE — 85025 COMPLETE CBC W/AUTO DIFF WBC: CPT | Performed by: OBSTETRICS & GYNECOLOGY

## 2019-06-18 PROCEDURE — P9016 RBC LEUKOCYTES REDUCED: HCPCS

## 2019-06-18 RX ORDER — NICOTINE 21 MG/24HR
1 PATCH, TRANSDERMAL 24 HOURS TRANSDERMAL
Status: DISCONTINUED | OUTPATIENT
Start: 2019-06-18 | End: 2019-06-19 | Stop reason: HOSPADM

## 2019-06-18 RX ADMIN — Medication: at 17:59

## 2019-06-18 RX ADMIN — NICOTINE 1 PATCH: 14 PATCH TRANSDERMAL at 20:30

## 2019-06-18 RX ADMIN — HYDROCODONE BITARTRATE AND ACETAMINOPHEN 1 TABLET: 5; 325 TABLET ORAL at 11:19

## 2019-06-18 RX ADMIN — BENZOCAINE 1 APPLICATION: 5.6 OINTMENT TOPICAL at 17:59

## 2019-06-18 RX ADMIN — WITCH HAZEL 1 PAD: 500 SOLUTION RECTAL; TOPICAL at 17:59

## 2019-06-18 RX ADMIN — HYDROCODONE BITARTRATE AND ACETAMINOPHEN 1 TABLET: 5; 325 TABLET ORAL at 04:52

## 2019-06-18 RX ADMIN — DOCUSATE SODIUM 100 MG: 100 CAPSULE, LIQUID FILLED ORAL at 17:59

## 2019-06-18 RX ADMIN — DEXTROSE AND SODIUM CHLORIDE 96 ML/HR: 5; 200 INJECTION, SOLUTION INTRAVENOUS at 07:02

## 2019-06-18 RX ADMIN — HYDROCODONE BITARTRATE AND ACETAMINOPHEN 1 TABLET: 5; 325 TABLET ORAL at 09:59

## 2019-06-18 RX ADMIN — IBUPROFEN 600 MG: 600 TABLET, FILM COATED ORAL at 11:19

## 2019-06-18 RX ADMIN — IBUPROFEN 600 MG: 600 TABLET, FILM COATED ORAL at 17:59

## 2019-06-18 RX ADMIN — HYDROCODONE BITARTRATE AND ACETAMINOPHEN 1 TABLET: 5; 325 TABLET ORAL at 15:20

## 2019-06-18 RX ADMIN — HYDROCODONE BITARTRATE AND ACETAMINOPHEN 1 TABLET: 5; 325 TABLET ORAL at 20:13

## 2019-06-18 NOTE — PLAN OF CARE
Problem: Postpartum (Vaginal Delivery) (Adult,Obstetrics,Pediatric)  Goal: Signs and Symptoms of Listed Potential Problems Will be Absent, Minimized or Managed (Postpartum)  Outcome: Ongoing (interventions implemented as appropriate)   06/18/19 0021   Goal/Outcome Evaluation   Problems Assessed (Postpartum Vaginal Delivery) all   Problems Present (Postpartum Vag Deliv) hemorrhage;pain

## 2019-06-18 NOTE — PROGRESS NOTES
FANTA Salazar  Vaginal Delivery Progress Note    Subjective     Doing well, sitting up in bed eating breakfast.   pain controlled, lochia less than menses  Jeremi balloon expelled during BM earlier this morning.    Objective     Vital Signs Range for the last 24 hours  Temperature: Temp:  [97.7 °F (36.5 °C)-99.6 °F (37.6 °C)] 98.2 °F (36.8 °C)   Temp Source: Temp src: Oral   BP: BP: ()/(49-86) 131/59   Pulse: Heart Rate:  [] 81   Respirations: Resp:  [16-18] 18   SPO2: SpO2:  [95 %-100 %] 98 %   O2 Amount (l/min):     O2 Devices           Physical Exam:  General:  no acute distresss.  Abdomen: Soft, non-tender, fundus firm  Extremities: normal, atraumatic, no cyanosis, and trace edema.       Lab results reviewed:  CBC pending at time of rounds  Lab Results   Component Value Date    WBC 13.64 (H) 06/18/2019    WBC 13.64 (H) 06/18/2019    HGB 5.9 (C) 06/18/2019    HGB 5.9 (C) 06/18/2019    HCT 19.1 (C) 06/18/2019    HCT 19.1 (C) 06/18/2019    MCV 83.8 06/18/2019    MCV 83.8 06/18/2019     06/18/2019     06/18/2019         Assessment/Plan     Preeclampsia  Postpartum hemorrhage    Fatuma C Mounce is Day 1  post-partum       Plan:  Continue current care.  Will stop magnesium after 24 hours and start po meds  Will transfer to  with stable CBC and BPs        Daisy Eagle CNM  6/18/2019  10:58 AM

## 2019-06-18 NOTE — ANESTHESIA POSTPROCEDURE EVALUATION
Patient: Fatuma Barnes    Procedure Summary     Date:  06/17/19 Room / Location:      Anesthesia Start:  0815 Anesthesia Stop:  0954    Procedure:  LABOR ANALGESIA Diagnosis:      Scheduled Providers:   Provider:  Gabriel Gibson MD    Anesthesia Type:  epidural ASA Status:  3          Anesthesia Type: epidural  Last vitals  BP   130/69 (06/18/19 1328)   Temp   98.7 °F (37.1 °C) (06/18/19 1213)   Pulse   88 (06/18/19 1328)   Resp   18 (06/18/19 1213)     SpO2   98 % (06/17/19 1856)     Post Anesthesia Care and Evaluation    Patient location during evaluation: bedside  Patient participation: complete - patient participated  Level of consciousness: awake and alert  Pain management: adequate  Airway patency: patent  Anesthetic complications: No anesthetic complications    Cardiovascular status: acceptable  Respiratory status: acceptable  Hydration status: acceptable  Post Neuraxial Block status: Motor and sensory function returned to baseline and No signs or symptoms of PDPH

## 2019-06-18 NOTE — PROGRESS NOTES
Phoned by laborist to discuss patient's CBC.    She is day 1 pp with immediate pp hemorrhage.   H&H 5.9/16.4  Will proceed with 2 units PRBCs prior to transfer to [facility]    YANCY Wolf, IRINA

## 2019-06-18 NOTE — PROGRESS NOTES
Laborist    Called re CBC   5.9/19.4     Patient without any complaints, lochia light  Vital signs stable afebrile with good urine output    IMP postpartum day 1 after a vaginal delivery induced for preeclampsia         Primary postpartum hemorrhage         Severe anemia     PLAN        Discussed with patient her lab results and recommend a transfusion of packed red blood cells.Patient the methodology and the risk.  Patient agreed, all questions answered, informed consent obtained.       Discussed with provider Kellen Eagle CNM

## 2019-06-19 VITALS
HEIGHT: 68 IN | RESPIRATION RATE: 18 BRPM | WEIGHT: 269 LBS | OXYGEN SATURATION: 98 % | TEMPERATURE: 98.3 F | HEART RATE: 75 BPM | SYSTOLIC BLOOD PRESSURE: 121 MMHG | DIASTOLIC BLOOD PRESSURE: 77 MMHG | BODY MASS INDEX: 40.77 KG/M2

## 2019-06-19 LAB
ABO + RH BLD: NORMAL
ABO + RH BLD: NORMAL
BH BB BLOOD EXPIRATION DATE: NORMAL
BH BB BLOOD EXPIRATION DATE: NORMAL
BH BB BLOOD TYPE BARCODE: 6200
BH BB BLOOD TYPE BARCODE: 6200
BH BB DISPENSE STATUS: NORMAL
BH BB DISPENSE STATUS: NORMAL
BH BB PRODUCT CODE: NORMAL
BH BB PRODUCT CODE: NORMAL
BH BB UNIT NUMBER: NORMAL
BH BB UNIT NUMBER: NORMAL
CROSSMATCH INTERPRETATION: NORMAL
CROSSMATCH INTERPRETATION: NORMAL
DEPRECATED RDW RBC AUTO: 43.7 FL (ref 37–54)
ERYTHROCYTE [DISTWIDTH] IN BLOOD BY AUTOMATED COUNT: 14.6 % (ref 12.3–15.4)
HCT VFR BLD AUTO: 22 % (ref 34–46.6)
HGB BLD-MCNC: 7.1 G/DL (ref 12–15.9)
MCH RBC QN AUTO: 26.8 PG (ref 26.6–33)
MCHC RBC AUTO-ENTMCNC: 32.3 G/DL (ref 31.5–35.7)
MCV RBC AUTO: 83 FL (ref 79–97)
PLATELET # BLD AUTO: 255 10*3/MM3 (ref 140–450)
PMV BLD AUTO: 11.7 FL (ref 6–12)
RBC # BLD AUTO: 2.65 10*6/MM3 (ref 3.77–5.28)
UNIT  ABO: NORMAL
UNIT  ABO: NORMAL
UNIT  RH: NORMAL
UNIT  RH: NORMAL
WBC NRBC COR # BLD: 12.03 10*3/MM3 (ref 3.4–10.8)

## 2019-06-19 PROCEDURE — 85027 COMPLETE CBC AUTOMATED: CPT | Performed by: NURSE PRACTITIONER

## 2019-06-19 RX ORDER — FERROUS SULFATE 325(65) MG
325 TABLET ORAL 2 TIMES DAILY
Qty: 60 TABLET | Refills: 3 | Status: SHIPPED | OUTPATIENT
Start: 2019-06-19

## 2019-06-19 RX ORDER — DOCUSATE SODIUM 100 MG/1
100 CAPSULE, LIQUID FILLED ORAL 2 TIMES DAILY
Qty: 60 CAPSULE | Refills: 1 | Status: SHIPPED | OUTPATIENT
Start: 2019-06-19

## 2019-06-19 RX ADMIN — HYDROCODONE BITARTRATE AND ACETAMINOPHEN 1 TABLET: 5; 325 TABLET ORAL at 09:24

## 2019-06-19 RX ADMIN — DOCUSATE SODIUM 100 MG: 100 CAPSULE, LIQUID FILLED ORAL at 08:59

## 2019-06-19 RX ADMIN — IBUPROFEN 600 MG: 600 TABLET, FILM COATED ORAL at 05:19

## 2019-06-19 RX ADMIN — HYDROCODONE BITARTRATE AND ACETAMINOPHEN 1 TABLET: 5; 325 TABLET ORAL at 05:19

## 2019-06-19 NOTE — PLAN OF CARE
Problem: Patient Care Overview  Goal: Plan of Care Review  Outcome: Ongoing (interventions implemented as appropriate)   06/19/19 0641   Coping/Psychosocial   Plan of Care Reviewed With patient   Plan of Care Review   Progress improving   OTHER   Outcome Summary VSS. U/2, firm, scant bleeding. Pain controlled with meds. H&H on the rise. Progressing towards D/C.     Goal: Individualization and Mutuality  Outcome: Ongoing (interventions implemented as appropriate)    Goal: Discharge Needs Assessment  Outcome: Ongoing (interventions implemented as appropriate)    Goal: Interprofessional Rounds/Family Conf  Outcome: Ongoing (interventions implemented as appropriate)      Problem: Postpartum (Vaginal Delivery) (Adult,Obstetrics,Pediatric)  Goal: Signs and Symptoms of Listed Potential Problems Will be Absent, Minimized or Managed (Postpartum)  Outcome: Ongoing (interventions implemented as appropriate)

## 2019-06-19 NOTE — DISCHARGE SUMMARY
[facility]  Delivery Discharge Summary    Primary OB Clinician:     EDC: Estimated Date of Delivery: 19    Gestational Age:39w3d    Date of Admission: 2019    Admission Diagnosis:  Induction of labor for GDM    Discharge Diagnosis: , postpartum hemorrhages/p blood transfusion    Date of Delivery: 2019   Time of Delivery: 9:48 AM     Delivered By:  Dennise Sanderson     Delivery Type: Vaginal, Spontaneous          Baby:@BABYNOHDR(SEX)@ infant;   Apgar:  7   @ 1 minute /   Apgar:  9   @ 5 minutes   Weight: @BABYNOHDR(BIRTHWEIGHT)@   Length: @BABYNOHDR(DELRECITEM,HSB,10453,,,,)@    Anesthesia: Epidural      Laceration: Yes  Laceration Information  Laceration Repaired?   Perineal: 1st  Yes    Periurethral:         Labial:         Sulcus:         Vaginal: No       Cervical: No              Exam:  Normal postpartum exam    Hospital Course:  Hospital course has been stable.  Patient is tolerating po, voiding without difficulty and ready for discharge.  Please see medication reconciliation and lab report for additional details.      Follow Up:  Patient has been given a follow up appointment in our office in 1 week. Postpartum lochia, mastitis, depression, and infection warning signs reviewed. Pelvic rest for 6 weeks. Call office with any fevers > 100.4.     Discharge Date: 2019; Discharge Time: 8:43 AM

## 2019-06-19 NOTE — PROGRESS NOTES
[facility]  Vaginal Delivery Progress Note    Subjective     Doing well, pain controlled, lochia less than menses. She feels well. States she feels much better after blood transfusion. She denies light headedness or dizziness, denies fatigue or headache.       Objective     Vital Signs Range for the last 24 hours  Temperature: Temp:  [97.6 °F (36.4 °C)-99 °F (37.2 °C)] 98.3 °F (36.8 °C)   Temp Source: Temp src: Oral   BP: BP: ()/(53-82) 121/77   Pulse: Heart Rate:  [70-96] 75   Respirations: Resp:  [16-18] 18   SPO2:     O2 Amount (l/min):     O2 Devices           Physical Exam:  General:  no acute distresss.  Abdomen: Soft, non-tender, fundus firm  Extremities: normal, atraumatic, no cyanosis, and trace edema.       Lab results reviewed:  Yes    Lab Results   Component Value Date    WBC 12.03 (H) 06/19/2019    HGB 7.1 (L) 06/19/2019    HCT 22.0 (L) 06/19/2019    MCV 83.0 06/19/2019     06/19/2019         Assessment/Plan     Spontaneous vaginal delivery  Postpartum hemorrhage    Fatuma C Mounce is Day 2  post-partum       Plan: discharge home today with short interval follow up in clinic in 1 week      Kaylah Rapp CNM  6/19/2019  8:37 AM

## 2019-06-23 ENCOUNTER — HOSPITAL ENCOUNTER (OUTPATIENT)
Facility: HOSPITAL | Age: 34
End: 2019-06-23
Attending: OBSTETRICS & GYNECOLOGY | Admitting: OBSTETRICS & GYNECOLOGY

## 2019-06-23 ENCOUNTER — HOSPITAL ENCOUNTER (INPATIENT)
Facility: HOSPITAL | Age: 34
LOS: 2 days | Discharge: HOME | End: 2019-06-25
Attending: OBSTETRICS & GYNECOLOGY | Admitting: OBSTETRICS & GYNECOLOGY

## 2019-06-23 LAB
ABO GROUP BLD: NORMAL
ALP SERPL-CCNC: 147 U/L (ref 39–117)
ALT SERPL W P-5'-P-CCNC: 16 U/L (ref 1–33)
ANION GAP SERPL CALCULATED.3IONS-SCNC: 14 MMOL/L
APTT PPP: 25.8 SECONDS (ref 24–37)
AST SERPL-CCNC: 20 U/L (ref 1–32)
BILIRUB SERPL-MCNC: <0.2 MG/DL (ref 0.2–1.2)
BLD GP AB SCN SERPL QL: NEGATIVE
BUN BLD-MCNC: 16 MG/DL (ref 6–20)
BUN/CREAT SERPL: 23.9 (ref 7–25)
CALCIUM SPEC-SCNC: 9.3 MG/DL (ref 8.6–10.5)
CHLORIDE SERPL-SCNC: 101 MMOL/L (ref 98–107)
CO2 SERPL-SCNC: 25 MMOL/L (ref 22–29)
CREAT BLD-MCNC: 0.67 MG/DL (ref 0.57–1)
CREAT BLD-MCNC: 0.67 MG/DL (ref 0.57–1)
D-LACTATE SERPL-SCNC: 1.5 MMOL/L (ref 0.5–2)
DEPRECATED RDW RBC AUTO: 46.8 FL (ref 37–54)
ERYTHROCYTE [DISTWIDTH] IN BLOOD BY AUTOMATED COUNT: 15.5 % (ref 12.3–15.4)
FIBRINOGEN PPP-MCNC: 593 MG/DL (ref 220–400)
GFR SERPL CREATININE-BSD FRML MDRD: 100 ML/MIN/1.73
GLUCOSE BLD-MCNC: 112 MG/DL (ref 65–99)
GLUCOSE BLDC GLUCOMTR-MCNC: 127 MG/DL (ref 70–130)
GLUCOSE BLDC GLUCOMTR-MCNC: 146 MG/DL (ref 70–130)
GLUCOSE BLDC GLUCOMTR-MCNC: 94 MG/DL (ref 70–130)
HCT VFR BLD AUTO: 27.8 % (ref 34–46.6)
HGB BLD-MCNC: 8.6 G/DL (ref 12–15.9)
INR PPP: 1.05 (ref 0.85–1.16)
LDH SERPL-CCNC: 185 U/L (ref 135–214)
MCH RBC QN AUTO: 27 PG (ref 26.6–33)
MCHC RBC AUTO-ENTMCNC: 30.9 G/DL (ref 31.5–35.7)
MCV RBC AUTO: 87.1 FL (ref 79–97)
PLATELET # BLD AUTO: 479 10*3/MM3 (ref 140–450)
PMV BLD AUTO: 10 FL (ref 6–12)
POTASSIUM BLD-SCNC: 4.3 MMOL/L (ref 3.5–5.2)
PROTHROMBIN TIME: 13.2 SECONDS (ref 11.2–14.3)
RBC # BLD AUTO: 3.19 10*6/MM3 (ref 3.77–5.28)
RH BLD: POSITIVE
SODIUM BLD-SCNC: 140 MMOL/L (ref 136–145)
T&S EXPIRATION DATE: NORMAL
URATE SERPL-MCNC: 5 MG/DL (ref 2.4–5.7)
WBC NRBC COR # BLD: 12.85 10*3/MM3 (ref 3.4–10.8)

## 2019-06-23 PROCEDURE — 25010000002 HYDROMORPHONE PER 4 MG: Performed by: OBSTETRICS & GYNECOLOGY

## 2019-06-23 PROCEDURE — 82962 GLUCOSE BLOOD TEST: CPT

## 2019-06-23 PROCEDURE — 85730 THROMBOPLASTIN TIME PARTIAL: CPT | Performed by: OBSTETRICS & GYNECOLOGY

## 2019-06-23 PROCEDURE — 83605 ASSAY OF LACTIC ACID: CPT | Performed by: OBSTETRICS & GYNECOLOGY

## 2019-06-23 PROCEDURE — 86901 BLOOD TYPING SEROLOGIC RH(D): CPT | Performed by: OBSTETRICS & GYNECOLOGY

## 2019-06-23 PROCEDURE — 82565 ASSAY OF CREATININE: CPT | Performed by: OBSTETRICS & GYNECOLOGY

## 2019-06-23 PROCEDURE — 84450 TRANSFERASE (AST) (SGOT): CPT | Performed by: OBSTETRICS & GYNECOLOGY

## 2019-06-23 PROCEDURE — 83615 LACTATE (LD) (LDH) ENZYME: CPT | Performed by: OBSTETRICS & GYNECOLOGY

## 2019-06-23 PROCEDURE — 82247 BILIRUBIN TOTAL: CPT | Performed by: OBSTETRICS & GYNECOLOGY

## 2019-06-23 PROCEDURE — 80048 BASIC METABOLIC PNL TOTAL CA: CPT | Performed by: OBSTETRICS & GYNECOLOGY

## 2019-06-23 PROCEDURE — 84075 ASSAY ALKALINE PHOSPHATASE: CPT | Performed by: OBSTETRICS & GYNECOLOGY

## 2019-06-23 PROCEDURE — 85027 COMPLETE CBC AUTOMATED: CPT | Performed by: OBSTETRICS & GYNECOLOGY

## 2019-06-23 PROCEDURE — 86900 BLOOD TYPING SEROLOGIC ABO: CPT | Performed by: OBSTETRICS & GYNECOLOGY

## 2019-06-23 PROCEDURE — 84460 ALANINE AMINO (ALT) (SGPT): CPT | Performed by: OBSTETRICS & GYNECOLOGY

## 2019-06-23 PROCEDURE — 85384 FIBRINOGEN ACTIVITY: CPT | Performed by: OBSTETRICS & GYNECOLOGY

## 2019-06-23 PROCEDURE — 86850 RBC ANTIBODY SCREEN: CPT | Performed by: OBSTETRICS & GYNECOLOGY

## 2019-06-23 PROCEDURE — 85610 PROTHROMBIN TIME: CPT | Performed by: OBSTETRICS & GYNECOLOGY

## 2019-06-23 PROCEDURE — 84550 ASSAY OF BLOOD/URIC ACID: CPT | Performed by: OBSTETRICS & GYNECOLOGY

## 2019-06-23 PROCEDURE — 25010000002 MAGNESIUM SULFATE-LACT RINGERS 40 GM/580ML SOLUTION: Performed by: OBSTETRICS & GYNECOLOGY

## 2019-06-23 PROCEDURE — 99222 1ST HOSP IP/OBS MODERATE 55: CPT | Performed by: OBSTETRICS & GYNECOLOGY

## 2019-06-23 RX ORDER — LABETALOL HYDROCHLORIDE 5 MG/ML
20-80 INJECTION, SOLUTION INTRAVENOUS
Status: ACTIVE | OUTPATIENT
Start: 2019-06-23 | End: 2019-06-24

## 2019-06-23 RX ORDER — MAGNESIUM SULF/RINGERS LACTATE 40 G/500ML
2 PLASTIC BAG, INJECTION (ML) INTRAVENOUS CONTINUOUS
Status: DISCONTINUED | OUTPATIENT
Start: 2019-06-23 | End: 2019-06-25 | Stop reason: HOSPADM

## 2019-06-23 RX ORDER — LABETALOL 200 MG/1
200 TABLET, FILM COATED ORAL EVERY 8 HOURS
Status: DISCONTINUED | OUTPATIENT
Start: 2019-06-23 | End: 2019-06-25 | Stop reason: HOSPADM

## 2019-06-23 RX ORDER — NICOTINE 21 MG/24HR
1 PATCH, TRANSDERMAL 24 HOURS TRANSDERMAL
Status: DISCONTINUED | OUTPATIENT
Start: 2019-06-23 | End: 2019-06-25 | Stop reason: HOSPADM

## 2019-06-23 RX ORDER — DOCUSATE SODIUM 100 MG/1
100 CAPSULE, LIQUID FILLED ORAL 2 TIMES DAILY
Status: DISCONTINUED | OUTPATIENT
Start: 2019-06-23 | End: 2019-06-25 | Stop reason: HOSPADM

## 2019-06-23 RX ORDER — ZOLPIDEM TARTRATE 5 MG/1
5 TABLET ORAL NIGHTLY PRN
Status: DISCONTINUED | OUTPATIENT
Start: 2019-06-23 | End: 2019-06-25 | Stop reason: HOSPADM

## 2019-06-23 RX ORDER — ONDANSETRON 4 MG/1
4 TABLET, FILM COATED ORAL EVERY 8 HOURS PRN
Status: DISCONTINUED | OUTPATIENT
Start: 2019-06-23 | End: 2019-06-25 | Stop reason: HOSPADM

## 2019-06-23 RX ORDER — NICOTINE POLACRILEX 4 MG
15 LOZENGE BUCCAL
Status: DISCONTINUED | OUTPATIENT
Start: 2019-06-23 | End: 2019-06-23

## 2019-06-23 RX ORDER — FERROUS SULFATE 325(65) MG
325 TABLET ORAL 2 TIMES DAILY
Status: DISCONTINUED | OUTPATIENT
Start: 2019-06-23 | End: 2019-06-25 | Stop reason: HOSPADM

## 2019-06-23 RX ORDER — NALOXONE HCL 0.4 MG/ML
0.4 VIAL (ML) INJECTION
Status: DISCONTINUED | OUTPATIENT
Start: 2019-06-23 | End: 2019-06-25 | Stop reason: HOSPADM

## 2019-06-23 RX ORDER — SODIUM CHLORIDE 0.9 % (FLUSH) 0.9 %
3 SYRINGE (ML) INJECTION EVERY 12 HOURS SCHEDULED
Status: DISCONTINUED | OUTPATIENT
Start: 2019-06-23 | End: 2019-06-23

## 2019-06-23 RX ORDER — CITALOPRAM 20 MG/1
20 TABLET ORAL DAILY
Status: DISCONTINUED | OUTPATIENT
Start: 2019-06-23 | End: 2019-06-25 | Stop reason: HOSPADM

## 2019-06-23 RX ORDER — LIDOCAINE HYDROCHLORIDE 10 MG/ML
5 INJECTION, SOLUTION EPIDURAL; INFILTRATION; INTRACAUDAL; PERINEURAL AS NEEDED
Status: DISCONTINUED | OUTPATIENT
Start: 2019-06-23 | End: 2019-06-23

## 2019-06-23 RX ORDER — ACETAMINOPHEN 325 MG/1
650 TABLET ORAL EVERY 4 HOURS PRN
Status: DISCONTINUED | OUTPATIENT
Start: 2019-06-23 | End: 2019-06-25 | Stop reason: HOSPADM

## 2019-06-23 RX ORDER — HYDROMORPHONE HYDROCHLORIDE 1 MG/ML
0.5 INJECTION, SOLUTION INTRAMUSCULAR; INTRAVENOUS; SUBCUTANEOUS
Status: DISCONTINUED | OUTPATIENT
Start: 2019-06-23 | End: 2019-06-25 | Stop reason: HOSPADM

## 2019-06-23 RX ORDER — DEXTROSE AND SODIUM CHLORIDE 5; .2 G/100ML; G/100ML
96 INJECTION, SOLUTION INTRAVENOUS CONTINUOUS
Status: DISCONTINUED | OUTPATIENT
Start: 2019-06-23 | End: 2019-06-25 | Stop reason: HOSPADM

## 2019-06-23 RX ORDER — SODIUM CHLORIDE 0.9 % (FLUSH) 0.9 %
1-10 SYRINGE (ML) INJECTION AS NEEDED
Status: DISCONTINUED | OUTPATIENT
Start: 2019-06-23 | End: 2019-06-25 | Stop reason: HOSPADM

## 2019-06-23 RX ORDER — DEXTROSE MONOHYDRATE 25 G/50ML
25 INJECTION, SOLUTION INTRAVENOUS
Status: DISCONTINUED | OUTPATIENT
Start: 2019-06-23 | End: 2019-06-23

## 2019-06-23 RX ORDER — ONDANSETRON 2 MG/ML
4 INJECTION INTRAMUSCULAR; INTRAVENOUS EVERY 8 HOURS PRN
Status: DISCONTINUED | OUTPATIENT
Start: 2019-06-23 | End: 2019-06-25 | Stop reason: HOSPADM

## 2019-06-23 RX ADMIN — CITALOPRAM HYDROBROMIDE 20 MG: 20 TABLET ORAL at 21:09

## 2019-06-23 RX ADMIN — LABETALOL HYDROCHLORIDE 20 MG: 5 INJECTION INTRAVENOUS at 17:16

## 2019-06-23 RX ADMIN — ACETAMINOPHEN 650 MG: 325 TABLET ORAL at 23:59

## 2019-06-23 RX ADMIN — DEXTROSE AND SODIUM CHLORIDE 96 ML/HR: 5; 200 INJECTION, SOLUTION INTRAVENOUS at 17:05

## 2019-06-23 RX ADMIN — FERROUS SULFATE TAB 325 MG (65 MG ELEMENTAL FE) 325 MG: 325 (65 FE) TAB at 20:08

## 2019-06-23 RX ADMIN — NICOTINE 1 PATCH: 14 PATCH, EXTENDED RELEASE TRANSDERMAL at 20:08

## 2019-06-23 RX ADMIN — HYDROMORPHONE HYDROCHLORIDE 0.5 MG: 1 INJECTION, SOLUTION INTRAMUSCULAR; INTRAVENOUS; SUBCUTANEOUS at 23:59

## 2019-06-23 RX ADMIN — ACETAMINOPHEN 650 MG: 325 TABLET ORAL at 20:12

## 2019-06-23 RX ADMIN — DOCUSATE SODIUM 100 MG: 100 CAPSULE, LIQUID FILLED ORAL at 20:08

## 2019-06-23 RX ADMIN — HYDROMORPHONE HYDROCHLORIDE 0.5 MG: 1 INJECTION, SOLUTION INTRAMUSCULAR; INTRAVENOUS; SUBCUTANEOUS at 21:12

## 2019-06-23 RX ADMIN — LABETALOL HYDROCHLORIDE 200 MG: 200 TABLET, FILM COATED ORAL at 20:08

## 2019-06-23 RX ADMIN — MAGNESIUM SULFATE HEPTAHYDRATE 2 G/HR: 500 INJECTION, SOLUTION INTRAMUSCULAR; INTRAVENOUS at 17:33

## 2019-06-24 LAB
GLUCOSE BLDC GLUCOMTR-MCNC: 111 MG/DL (ref 70–130)
GLUCOSE BLDC GLUCOMTR-MCNC: 115 MG/DL (ref 70–130)
GLUCOSE BLDC GLUCOMTR-MCNC: 91 MG/DL (ref 70–130)

## 2019-06-24 PROCEDURE — 82962 GLUCOSE BLOOD TEST: CPT

## 2019-06-24 PROCEDURE — 25010000002 MAGNESIUM SULFATE-LACT RINGERS 40 GM/580ML SOLUTION: Performed by: OBSTETRICS & GYNECOLOGY

## 2019-06-24 RX ADMIN — NICOTINE 1 PATCH: 14 PATCH, EXTENDED RELEASE TRANSDERMAL at 08:12

## 2019-06-24 RX ADMIN — LABETALOL HYDROCHLORIDE 200 MG: 200 TABLET, FILM COATED ORAL at 12:00

## 2019-06-24 RX ADMIN — LABETALOL HYDROCHLORIDE 200 MG: 200 TABLET, FILM COATED ORAL at 20:14

## 2019-06-24 RX ADMIN — FERROUS SULFATE TAB 325 MG (65 MG ELEMENTAL FE) 325 MG: 325 (65 FE) TAB at 08:12

## 2019-06-24 RX ADMIN — FERROUS SULFATE TAB 325 MG (65 MG ELEMENTAL FE) 325 MG: 325 (65 FE) TAB at 21:17

## 2019-06-24 RX ADMIN — ACETAMINOPHEN 650 MG: 325 TABLET ORAL at 22:34

## 2019-06-24 RX ADMIN — ACETAMINOPHEN 650 MG: 325 TABLET ORAL at 12:00

## 2019-06-24 RX ADMIN — DEXTROSE AND SODIUM CHLORIDE 96 ML/HR: 5; 200 INJECTION, SOLUTION INTRAVENOUS at 21:33

## 2019-06-24 RX ADMIN — DOCUSATE SODIUM 100 MG: 100 CAPSULE, LIQUID FILLED ORAL at 21:17

## 2019-06-24 RX ADMIN — ZOLPIDEM TARTRATE 5 MG: 5 TABLET ORAL at 22:34

## 2019-06-24 RX ADMIN — ACETAMINOPHEN 650 MG: 325 TABLET ORAL at 08:12

## 2019-06-24 RX ADMIN — CITALOPRAM HYDROBROMIDE 20 MG: 20 TABLET ORAL at 08:12

## 2019-06-24 RX ADMIN — ACETAMINOPHEN 650 MG: 325 TABLET ORAL at 04:02

## 2019-06-24 RX ADMIN — DOCUSATE SODIUM 100 MG: 100 CAPSULE, LIQUID FILLED ORAL at 08:12

## 2019-06-24 RX ADMIN — MAGNESIUM SULFATE HEPTAHYDRATE 2 G/HR: 500 INJECTION, SOLUTION INTRAMUSCULAR; INTRAVENOUS at 10:08

## 2019-06-24 RX ADMIN — LABETALOL HYDROCHLORIDE 200 MG: 200 TABLET, FILM COATED ORAL at 04:11

## 2019-06-24 RX ADMIN — DEXTROSE AND SODIUM CHLORIDE 96 ML/HR: 5; 200 INJECTION, SOLUTION INTRAVENOUS at 11:25

## 2019-06-24 RX ADMIN — NICOTINE 1 PATCH: 14 PATCH, EXTENDED RELEASE TRANSDERMAL at 22:58

## 2019-06-24 RX ADMIN — ACETAMINOPHEN 650 MG: 325 TABLET ORAL at 17:20

## 2019-06-25 VITALS
TEMPERATURE: 98.9 F | HEART RATE: 66 BPM | RESPIRATION RATE: 16 BRPM | SYSTOLIC BLOOD PRESSURE: 141 MMHG | DIASTOLIC BLOOD PRESSURE: 71 MMHG

## 2019-06-25 PROBLEM — O24.419 GESTATIONAL DIABETES MELLITUS (GDM) IN THIRD TRIMESTER: Status: RESOLVED | Noted: 2019-05-08 | Resolved: 2019-06-25

## 2019-06-25 LAB
GLUCOSE BLDC GLUCOMTR-MCNC: 110 MG/DL (ref 70–130)
GLUCOSE BLDC GLUCOMTR-MCNC: 110 MG/DL (ref 70–130)
GLUCOSE BLDC GLUCOMTR-MCNC: 121 MG/DL (ref 70–130)

## 2019-06-25 PROCEDURE — 25010000002 MAGNESIUM SULFATE-LACT RINGERS 40 GM/580ML SOLUTION: Performed by: OBSTETRICS & GYNECOLOGY

## 2019-06-25 PROCEDURE — 82962 GLUCOSE BLOOD TEST: CPT

## 2019-06-25 RX ORDER — LABETALOL 200 MG/1
200 TABLET, FILM COATED ORAL EVERY 8 HOURS
Qty: 60 TABLET | Refills: 1 | Status: SHIPPED | OUTPATIENT
Start: 2019-06-25

## 2019-06-25 RX ADMIN — LABETALOL HYDROCHLORIDE 200 MG: 200 TABLET, FILM COATED ORAL at 12:33

## 2019-06-25 RX ADMIN — ACETAMINOPHEN 650 MG: 325 TABLET ORAL at 08:31

## 2019-06-25 RX ADMIN — LABETALOL HYDROCHLORIDE 200 MG: 200 TABLET, FILM COATED ORAL at 03:54

## 2019-06-25 RX ADMIN — NICOTINE 1 PATCH: 14 PATCH, EXTENDED RELEASE TRANSDERMAL at 10:22

## 2019-06-25 RX ADMIN — DOCUSATE SODIUM 100 MG: 100 CAPSULE, LIQUID FILLED ORAL at 08:31

## 2019-06-25 RX ADMIN — FERROUS SULFATE TAB 325 MG (65 MG ELEMENTAL FE) 325 MG: 325 (65 FE) TAB at 08:31

## 2019-06-25 RX ADMIN — MAGNESIUM SULFATE HEPTAHYDRATE 2 G/HR: 500 INJECTION, SOLUTION INTRAMUSCULAR; INTRAVENOUS at 06:56

## 2019-06-25 RX ADMIN — CITALOPRAM HYDROBROMIDE 20 MG: 20 TABLET ORAL at 08:31

## 2019-06-25 RX ADMIN — DEXTROSE AND SODIUM CHLORIDE 96 ML/HR: 5; 200 INJECTION, SOLUTION INTRAVENOUS at 06:56

## 2019-06-25 RX ADMIN — ACETAMINOPHEN 650 MG: 325 TABLET ORAL at 12:33

## 2019-06-25 RX ADMIN — ACETAMINOPHEN 650 MG: 325 TABLET ORAL at 16:41

## 2019-06-25 RX ADMIN — ACETAMINOPHEN 650 MG: 325 TABLET ORAL at 03:54

## 2019-07-13 LAB — GLUCOSE BLDC GLUCOMTR-MCNC: 131 MG/DL (ref 70–130)

## 2019-08-30 NOTE — TELEPHONE ENCOUNTER
Dr Mota will email the patient to ask for updated blood sugar readings and will address medications if needed   no

## 2020-07-02 ENCOUNTER — TRANSCRIBE ORDERS (OUTPATIENT)
Dept: LAB | Facility: HOSPITAL | Age: 35
End: 2020-07-02

## 2020-07-02 ENCOUNTER — LAB (OUTPATIENT)
Dept: LAB | Facility: HOSPITAL | Age: 35
End: 2020-07-02

## 2020-07-02 DIAGNOSIS — N92.1 METRORRHAGIA: ICD-10-CM

## 2020-07-02 DIAGNOSIS — Z01.419 ROUTINE GYNECOLOGICAL EXAMINATION: Primary | ICD-10-CM

## 2020-07-02 DIAGNOSIS — Z01.419 ROUTINE GYNECOLOGICAL EXAMINATION: ICD-10-CM

## 2020-07-02 LAB
HCG INTACT+B SERPL-ACNC: NORMAL MIU/ML
PROGEST SERPL-MCNC: 11.2 NG/ML

## 2020-07-02 PROCEDURE — 84144 ASSAY OF PROGESTERONE: CPT

## 2020-07-02 PROCEDURE — 36415 COLL VENOUS BLD VENIPUNCTURE: CPT

## 2020-07-02 PROCEDURE — 84702 CHORIONIC GONADOTROPIN TEST: CPT

## 2021-01-07 ENCOUNTER — HOSPITAL ENCOUNTER (INPATIENT)
Dept: HOSPITAL 79 - GENOP | Age: 36
LOS: 2 days | Discharge: HOME | End: 2021-01-09
Attending: OBSTETRICS & GYNECOLOGY | Admitting: OBSTETRICS & GYNECOLOGY
Payer: COMMERCIAL

## 2021-01-07 DIAGNOSIS — F11.20: ICD-10-CM

## 2021-01-07 DIAGNOSIS — Z20.822: ICD-10-CM

## 2021-01-07 DIAGNOSIS — Z3A.37: ICD-10-CM

## 2021-01-07 LAB
HGB BLD-MCNC: 10.3 GM/DL (ref 12.3–15.3)
RED BLOOD COUNT: 3.79 M/UL (ref 4–5.1)
WHITE BLOOD COUNT: 10.3 K/UL (ref 4.5–11)

## 2021-01-07 PROCEDURE — U0003 INFECTIOUS AGENT DETECTION BY NUCLEIC ACID (DNA OR RNA); SEVERE ACUTE RESPIRATORY SYNDROME CORONAVIRUS 2 (SARS-COV-2) (CORONAVIRUS DISEASE [COVID-19]), AMPLIFIED PROBE TECHNIQUE, MAKING USE OF HIGH THROUGHPUT TECHNOLOGIES AS DESCRIBED BY CMS-2020-01-R: HCPCS

## 2021-01-07 PROCEDURE — 4A1HX4Z MONITORING OF PRODUCTS OF CONCEPTION, CARDIAC ELECTRICAL ACTIVITY, EXTERNAL APPROACH: ICD-10-PCS | Performed by: OBSTETRICS & GYNECOLOGY

## 2021-01-08 PROCEDURE — 10907ZC DRAINAGE OF AMNIOTIC FLUID, THERAPEUTIC FROM PRODUCTS OF CONCEPTION, VIA NATURAL OR ARTIFICIAL OPENING: ICD-10-PCS | Performed by: OBSTETRICS & GYNECOLOGY

## 2021-01-09 LAB — HGB BLD-MCNC: 9.1 GM/DL (ref 12.3–15.3)

## 2024-07-26 ENCOUNTER — OFFICE VISIT (OUTPATIENT)
Dept: FAMILY MEDICINE CLINIC | Facility: CLINIC | Age: 39
End: 2024-07-26
Payer: COMMERCIAL

## 2024-07-26 VITALS
WEIGHT: 231 LBS | SYSTOLIC BLOOD PRESSURE: 132 MMHG | TEMPERATURE: 98 F | BODY MASS INDEX: 35.01 KG/M2 | DIASTOLIC BLOOD PRESSURE: 92 MMHG | OXYGEN SATURATION: 97 % | RESPIRATION RATE: 16 BRPM | HEART RATE: 78 BPM | HEIGHT: 68 IN

## 2024-07-26 DIAGNOSIS — Z13.1 SCREENING FOR DIABETES MELLITUS: ICD-10-CM

## 2024-07-26 DIAGNOSIS — L21.0 DANDRUFF: ICD-10-CM

## 2024-07-26 DIAGNOSIS — F41.9 ANXIETY: Primary | ICD-10-CM

## 2024-07-26 DIAGNOSIS — F11.11 HISTORY OF OPIOID ABUSE: ICD-10-CM

## 2024-07-26 DIAGNOSIS — Z86.19 HISTORY OF HEPATITIS C: ICD-10-CM

## 2024-07-26 DIAGNOSIS — E55.9 VITAMIN D DEFICIENCY: ICD-10-CM

## 2024-07-26 DIAGNOSIS — R76.8 HEPATITIS C ANTIBODY TEST POSITIVE: ICD-10-CM

## 2024-07-26 DIAGNOSIS — F32.A DEPRESSION, UNSPECIFIED DEPRESSION TYPE: ICD-10-CM

## 2024-07-26 DIAGNOSIS — E66.09 CLASS 2 OBESITY DUE TO EXCESS CALORIES WITHOUT SERIOUS COMORBIDITY WITH BODY MASS INDEX (BMI) OF 35.0 TO 35.9 IN ADULT: ICD-10-CM

## 2024-07-26 DIAGNOSIS — F90.0 ATTENTION DEFICIT HYPERACTIVITY DISORDER (ADHD), PREDOMINANTLY INATTENTIVE TYPE: ICD-10-CM

## 2024-07-26 PROCEDURE — 1159F MED LIST DOCD IN RCRD: CPT | Performed by: NURSE PRACTITIONER

## 2024-07-26 PROCEDURE — 1160F RVW MEDS BY RX/DR IN RCRD: CPT | Performed by: NURSE PRACTITIONER

## 2024-07-26 PROCEDURE — 1126F AMNT PAIN NOTED NONE PRSNT: CPT | Performed by: NURSE PRACTITIONER

## 2024-07-26 PROCEDURE — 99204 OFFICE O/P NEW MOD 45 MIN: CPT | Performed by: NURSE PRACTITIONER

## 2024-07-26 RX ORDER — IBUPROFEN 600 MG/1
TABLET ORAL
COMMUNITY
Start: 2024-05-25 | End: 2024-07-26

## 2024-07-26 RX ORDER — ESCITALOPRAM OXALATE 20 MG/1
20 TABLET ORAL DAILY
COMMUNITY
End: 2024-07-26

## 2024-07-26 RX ORDER — DULOXETIN HYDROCHLORIDE 60 MG/1
1 CAPSULE, DELAYED RELEASE ORAL DAILY
COMMUNITY
Start: 2024-05-31 | End: 2024-07-26

## 2024-07-26 RX ORDER — TIZANIDINE 4 MG/1
4 TABLET ORAL NIGHTLY PRN
COMMUNITY
End: 2024-08-12 | Stop reason: SDUPTHER

## 2024-07-26 RX ORDER — DESVENLAFAXINE SUCCINATE 50 MG/1
50 TABLET, EXTENDED RELEASE ORAL DAILY
Qty: 30 TABLET | Refills: 2 | Status: SHIPPED | OUTPATIENT
Start: 2024-07-26

## 2024-07-26 RX ORDER — DULOXETIN HYDROCHLORIDE 20 MG/1
CAPSULE, DELAYED RELEASE ORAL
Qty: 28 CAPSULE | Refills: 0 | Status: SHIPPED | OUTPATIENT
Start: 2024-07-26 | End: 2024-08-23

## 2024-07-26 RX ORDER — GUAIFENESIN 600 MG/1
TABLET, EXTENDED RELEASE ORAL
COMMUNITY
Start: 2024-04-07 | End: 2024-07-26

## 2024-07-26 RX ORDER — DESVENLAFAXINE 25 MG/1
25 TABLET, EXTENDED RELEASE ORAL DAILY
Qty: 7 TABLET | Refills: 0 | Status: SHIPPED | OUTPATIENT
Start: 2024-07-26

## 2024-07-26 RX ORDER — BUPRENORPHINE AND NALOXONE 8; 2 MG/1; MG/1
FILM, SOLUBLE BUCCAL; SUBLINGUAL
COMMUNITY
Start: 2024-07-24

## 2024-07-26 RX ORDER — OMEPRAZOLE 40 MG/1
40 CAPSULE, DELAYED RELEASE ORAL DAILY
COMMUNITY
Start: 2024-04-10 | End: 2024-07-26

## 2024-07-26 RX ORDER — FLUTICASONE PROPIONATE 50 MCG
SPRAY, SUSPENSION (ML) NASAL
COMMUNITY
Start: 2024-04-05 | End: 2024-07-26

## 2024-07-26 RX ORDER — BUSPIRONE HYDROCHLORIDE 10 MG/1
10 TABLET ORAL 2 TIMES DAILY PRN
Qty: 30 TABLET | Refills: 1 | Status: SHIPPED | OUTPATIENT
Start: 2024-07-26

## 2024-07-26 RX ORDER — AMMONIUM LACTATE 12 G/100G
CREAM TOPICAL SEE ADMIN INSTRUCTIONS
COMMUNITY
Start: 2024-06-04 | End: 2024-07-26

## 2024-07-26 RX ORDER — ONDANSETRON 8 MG/1
TABLET, ORALLY DISINTEGRATING ORAL
COMMUNITY
Start: 2024-04-05 | End: 2024-07-26

## 2024-07-26 RX ORDER — KETOCONAZOLE 20 MG/ML
SHAMPOO TOPICAL 2 TIMES WEEKLY
Qty: 120 ML | Refills: 5 | Status: SHIPPED | OUTPATIENT
Start: 2024-07-29

## 2024-07-26 NOTE — PROGRESS NOTES
New Patient History and Physical      Referring Physician: No ref. provider found    Chief Complaint:    Chief Complaint   Patient presents with    Anxiety     Pt is here to establish care and for anxiety          History of Present Illness:   Fatuma Barnes is a 39 y.o. female who presents today as a new patient.     History of anxiety, depression, ADHD.    Anxiety, depression--cymbalta for past 3-4 months, not helping, patient reports she is self-medicating with THC, alcohol, gabapentin that she is getting from a friend. Has been taking gabapentin 600mg BID.     ADHD--has taken other medications in the past but Vyvanse was the only thing that seemed to help. Strattera caused headaches, qelbree made symptoms worse.     History of opiate drug use, pills and heroin. Currently prescribed suboxone 14 years intermittently--has been to rehab 3 times to try to get off of the medication.    History of Hepatitis C but was never treated. Was diagnosed in 2010. Was told in 2016 that it was showing in her system but was not active.     History of gestational diabetes x 3, family history of diabetes    Reports history of dandruff, has used Nizoral in the past but has not been able to get a refill.    Subjective     Review of Systems   Psychiatric/Behavioral:  Positive for agitation, decreased concentration, dysphoric mood and sleep disturbance. Negative for self-injury and suicidal ideas. The patient is nervous/anxious.         The following portions of the patient's history were reviewed and updated as appropriate: allergies, current medications, past family history, past medical history, past social history, past surgical history and problem list.    Past Medical History:   Past Medical History:   Diagnosis Date    Depression     GERD (gastroesophageal reflux disease)     Gestational diabetes     Hepatitis C     History of transfusion     Kidney stones     Postpartum hemorrhage 06/16/2019    Preeclampsia        Past  "Surgical History:  Past Surgical History:   Procedure Laterality Date    D & C AND LAPAROSCOPY      KIDNEY STONE SURGERY         Family History: family history includes Hypertension in her father and mother.    Social History:  reports that she has been smoking electronic cigarette. She has never been exposed to tobacco smoke. She has never used smokeless tobacco. She reports that she does not drink alcohol and does not use drugs.    Medications:    Current Outpatient Medications:     buprenorphine-naloxone (SUBOXONE) 8-2 MG film film, DISSOLVE 2 FILMS UNDER THE TONGUE EVERY DAY, Disp: , Rfl:     busPIRone (BUSPAR) 10 MG tablet, Take 1 tablet by mouth 2 (Two) Times a Day As Needed (anxiety)., Disp: 30 tablet, Rfl: 1    desvenlafaxine (Pristiq) 50 MG 24 hr tablet, Take 1 tablet by mouth Daily., Disp: 30 tablet, Rfl: 2    Desvenlafaxine Succinate ER (Pristiq) 25 MG tablet sustained-release 24 hour, Take 1 tablet by mouth Daily., Disp: 7 tablet, Rfl: 0    DULoxetine (CYMBALTA) 20 MG capsule, Take 2 capsules by mouth Daily for 7 days, THEN 1 capsule Daily for 7 days, THEN 1 capsule Every Other Day for 14 days., Disp: 28 capsule, Rfl: 0    ketoconazole (NIZORAL) 2 % shampoo, Apply  topically to the appropriate area as directed 2 (Two) Times a Week., Disp: 120 mL, Rfl: 5    tiZANidine (ZANAFLEX) 4 MG tablet, Take 1 tablet by mouth At Night As Needed for Muscle Spasms., Disp: 14 tablet, Rfl: 0    Allergies:  Allergies   Allergen Reactions    Penicillins Rash       Objective     Vital Signs:   /92   Pulse 78   Temp 98 °F (36.7 °C)   Resp 16   Ht 172.7 cm (68\")   Wt 105 kg (231 lb)   SpO2 97%   Breastfeeding No   BMI 35.12 kg/m²      Class 2 Severe Obesity (BMI >=35 and <=39.9). Obesity-related health conditions include the following: none. Obesity is newly identified. BMI is is above average; BMI management plan is completed. We discussed Information on healthy weight added to patient's after visit summary.   "        Physical Exam:  Physical Exam  Vitals and nursing note reviewed.   Constitutional:       Appearance: She is obese.   HENT:      Head: Hair is normal.   Eyes:      Extraocular Movements: Extraocular movements intact.      Pupils: Pupils are equal, round, and reactive to light.   Cardiovascular:      Rate and Rhythm: Normal rate and regular rhythm.   Pulmonary:      Effort: Pulmonary effort is normal.      Breath sounds: Normal breath sounds.   Neurological:      Mental Status: She is alert and oriented to person, place, and time.   Psychiatric:         Mood and Affect: Mood normal.         Behavior: Behavior normal.         Assessment / Plan     Assessment/Plan:   Diagnoses and all orders for this visit:    1. Anxiety (Primary)  -     DULoxetine (CYMBALTA) 20 MG capsule; Take 2 capsules by mouth Daily for 7 days, THEN 1 capsule Daily for 7 days, THEN 1 capsule Every Other Day for 14 days.  Dispense: 28 capsule; Refill: 0  -     Desvenlafaxine Succinate ER (Pristiq) 25 MG tablet sustained-release 24 hour; Take 1 tablet by mouth Daily.  Dispense: 7 tablet; Refill: 0  -     desvenlafaxine (Pristiq) 50 MG 24 hr tablet; Take 1 tablet by mouth Daily.  Dispense: 30 tablet; Refill: 2  -     busPIRone (BUSPAR) 10 MG tablet; Take 1 tablet by mouth 2 (Two) Times a Day As Needed (anxiety).  Dispense: 30 tablet; Refill: 1  -     Ambulatory Referral to Behavioral Health    2. Dandruff  -     ketoconazole (NIZORAL) 2 % shampoo; Apply  topically to the appropriate area as directed 2 (Two) Times a Week.  Dispense: 120 mL; Refill: 5    3. Vitamin D deficiency  -     Vitamin D 25 hydroxy    4. Class 2 obesity due to excess calories without serious comorbidity with body mass index (BMI) of 35.0 to 35.9 in adult  -     Comprehensive metabolic panel  -     TSH  -     T4, free    5. History of hepatitis C  -     Hepatitis panel, acute  -     Hepatitis C Virus (HCV) RNA, Diagnosis    6. Screening for diabetes mellitus  -      "Hemoglobin A1c    7. Depression, unspecified depression type  -     DULoxetine (CYMBALTA) 20 MG capsule; Take 2 capsules by mouth Daily for 7 days, THEN 1 capsule Daily for 7 days, THEN 1 capsule Every Other Day for 14 days.  Dispense: 28 capsule; Refill: 0  -     Desvenlafaxine Succinate ER (Pristiq) 25 MG tablet sustained-release 24 hour; Take 1 tablet by mouth Daily.  Dispense: 7 tablet; Refill: 0  -     desvenlafaxine (Pristiq) 50 MG 24 hr tablet; Take 1 tablet by mouth Daily.  Dispense: 30 tablet; Refill: 2  -     Ambulatory Referral to Behavioral Health    8. Attention deficit hyperactivity disorder (ADHD), predominantly inattentive type  -     Ambulatory Referral to Behavioral Health    9. History of opioid abuse  -     Ambulatory Referral to Behavioral Health  -     Cancel: Compliance Drug Analysis, Ur - Urine, Clean Catch; Future  -     Cancel: Compliance Drug Analysis, Ur - Urine, Clean Catch  -     Urine Drug Screen - Urine, Clean Catch    10. Hepatitis C antibody test positive  -     Hepatitis C Virus (HCV) RNA, Diagnosis    Other orders  -     HCV RT-PCR Quant (Non-Graph)  -     Request Problem    Risks, benefits, and potential side effects of current/new medications reviewed with patient.  Patient voiced understanding and wished to proceed with treatment.    I will contact patient regarding test results and provide instructions regarding any necessary changes in plan of care.    Discussed need for stress/anxiety reducing techniques such as prayer/meditation/breathing and counting exercises and avoidance of stress producing environments/situations; will follow clinically.    Encouraged to contact [facility] at [phone] for additional behavioral health resources. Lines are available 7am-7pm seven days a week.     Patient encouraged to call \"[phone]\" for suicidal or homicidal ideations.     May also contact the triage line at [facility] in [place], [place] 24 " hours per day, seven days per week. [phone]    Patient was encouraged to keep me informed of any acute changes, lack of improvement, or any new concerning symptoms.    Discussion Summary:  Discussed plan of care in detail with pt today; pt verb understanding and agrees.        I have reviewed and updated all copied forward information, as appropriate.  I attest to the accuracy and relevance of any unchanged information.        Follow up:  Return in about 6 weeks (around 9/6/2024).     Patient Education:  There are no Patient Instructions on file for this visit.    YANCY Everett  08/12/24  16:04 EDT    Please note that portions of this note may have been completed with a voice recognition program.

## 2024-07-29 LAB
AMPHETAMINES UR QL SCN: NEGATIVE NG/ML
BARBITURATES UR QL SCN: NEGATIVE NG/ML
BENZODIAZ UR QL SCN: NEGATIVE NG/ML
BZE UR QL SCN: NEGATIVE NG/ML
CANNABINOIDS UR QL SCN: POSITIVE NG/ML
CREAT UR-MCNC: 64.9 MG/DL (ref 20–300)
LABORATORY COMMENT REPORT: ABNORMAL
METHADONE UR QL SCN: NEGATIVE NG/ML
OPIATES UR QL SCN: NEGATIVE NG/ML
OXYCODONE+OXYMORPHONE UR QL SCN: NEGATIVE NG/ML
PCP UR QL: NEGATIVE NG/ML
PH UR: 6.8 [PH] (ref 4.5–8.9)
PROPOXYPH UR QL SCN: NEGATIVE NG/ML

## 2024-07-30 ENCOUNTER — TELEPHONE (OUTPATIENT)
Dept: FAMILY MEDICINE CLINIC | Facility: CLINIC | Age: 39
End: 2024-07-30

## 2024-07-30 NOTE — TELEPHONE ENCOUNTER
Caller: LILLIAN [facility] DEPT    Relationship: Provider    Best call back number: [phone] LET   AND ASK FOR LILLIAN    What is the best time to reach you: ANY DURING DAY    Who are you requesting to speak with (clinical staff, provider,  specific staff member): CLINICAL    Do you know the name of the person who called: SELF    What was the call regarding: PROVIDER HAS QUESTIONS ABOUT THE HEP C RESULTS ON THIS PATIENT. PLEASE CALL TO VERIFY.     Is it okay if the provider responds through [employer]: NO

## 2024-07-31 LAB
25(OH)D3+25(OH)D2 SERPL-MCNC: 30.1 NG/ML (ref 30–100)
ALBUMIN SERPL-MCNC: 4.5 G/DL (ref 3.5–5.2)
ALBUMIN/GLOB SERPL: 1.8 G/DL
ALP SERPL-CCNC: 90 U/L (ref 39–117)
ALT SERPL-CCNC: 16 U/L (ref 1–33)
AST SERPL-CCNC: 19 U/L (ref 1–32)
BILIRUB SERPL-MCNC: 0.3 MG/DL (ref 0–1.2)
BUN SERPL-MCNC: 13 MG/DL (ref 6–20)
BUN/CREAT SERPL: 22 (ref 7–25)
CALCIUM SERPL-MCNC: 9.1 MG/DL (ref 8.6–10.5)
CHLORIDE SERPL-SCNC: 102 MMOL/L (ref 98–107)
CO2 SERPL-SCNC: 27.3 MMOL/L (ref 22–29)
CREAT SERPL-MCNC: 0.59 MG/DL (ref 0.57–1)
DIAGNOSTIC IMP SPEC-IMP: NORMAL
EGFRCR SERPLBLD CKD-EPI 2021: 117 ML/MIN/1.73
GLOBULIN SER CALC-MCNC: 2.5 GM/DL
GLUCOSE SERPL-MCNC: 87 MG/DL (ref 65–99)
HAV IGM SERPL QL IA: NEGATIVE
HBA1C MFR BLD: 5.4 % (ref 4.8–5.6)
HBV CORE IGM SERPL QL IA: NEGATIVE
HBV SURFACE AG SERPL QL IA: NEGATIVE
HCV IGG SERPL QL IA: REACTIVE
HCV RNA SERPL NAA+PROBE-ACNC: NORMAL IU/ML
POTASSIUM SERPL-SCNC: 4.2 MMOL/L (ref 3.5–5.2)
PROT SERPL-MCNC: 7 G/DL (ref 6–8.5)
REF LAB TEST REF RANGE: NORMAL
REQUEST PROBLEM: NORMAL
SODIUM SERPL-SCNC: 139 MMOL/L (ref 136–145)
T4 FREE SERPL-MCNC: 1.12 NG/DL (ref 0.92–1.68)
TSH SERPL DL<=0.005 MIU/L-ACNC: 1.26 UIU/ML (ref 0.27–4.2)

## 2024-08-01 LAB
HCV RNA SERPL NAA+PROBE-ACNC: NORMAL IU/ML
TEST INFORMATION: NORMAL

## 2024-08-12 ENCOUNTER — TELEMEDICINE (OUTPATIENT)
Dept: FAMILY MEDICINE CLINIC | Facility: TELEHEALTH | Age: 39
End: 2024-08-12
Payer: COMMERCIAL

## 2024-08-12 DIAGNOSIS — F41.9 ANXIETY: Primary | ICD-10-CM

## 2024-08-12 DIAGNOSIS — M54.2 NECK PAIN: Primary | ICD-10-CM

## 2024-08-12 DIAGNOSIS — F32.A DEPRESSION, UNSPECIFIED DEPRESSION TYPE: ICD-10-CM

## 2024-08-12 RX ORDER — TIZANIDINE 4 MG/1
4 TABLET ORAL NIGHTLY PRN
Qty: 14 TABLET | Refills: 0 | Status: SHIPPED | OUTPATIENT
Start: 2024-08-12

## 2024-08-12 NOTE — PROGRESS NOTES
You have chosen to receive care through a telehealth visit.  Do you consent to use a video/audio connection for your medical care today? Yes     CHIEF COMPLAINT  No chief complaint on file.        HPI  Fatuma Barnes is a 39 y.o. female  presents with complaint of out of tizanidine and needs refills that she takes for pain in neck/shoulders.  She has requested it from her PCP, but has not received refills yet.     Review of Systems  See HPI    Past Medical History:   Diagnosis Date    Depression     GERD (gastroesophageal reflux disease)     Gestational diabetes     Hepatitis C     History of transfusion     Kidney stones     Postpartum hemorrhage 06/16/2019    Preeclampsia        Family History   Problem Relation Age of Onset    Hypertension Mother     Hypertension Father        Social History     Socioeconomic History    Marital status:    Tobacco Use    Smoking status: Every Day     Types: Electronic Cigarette     Passive exposure: Never    Smokeless tobacco: Never    Tobacco comments:     100 vapes per day   Vaping Use    Vaping status: Never Used   Substance and Sexual Activity    Alcohol use: No    Drug use: No     Comment: Hx of heroin, percocet---> subutex---> clean 19 months    Sexual activity: Yes     Partners: Male     Birth control/protection: None       Fatuma Barnes  reports that she has been smoking electronic cigarette. She has never been exposed to tobacco smoke. She has never used smokeless tobacco.              There were no vitals taken for this visit.    PHYSICAL EXAM  Physical Exam   Constitutional: She is oriented to person, place, and time. She appears well-developed and well-nourished. She does not have a sickly appearance. She does not appear ill.   HENT:   Head: Normocephalic and atraumatic.   Pulmonary/Chest: Effort normal.  No respiratory distress.  Neurological: She is alert and oriented to person, place, and time.           Diagnoses and all orders for this visit:    1. Neck  pain (Primary)  -     tiZANidine (ZANAFLEX) 4 MG tablet; Take 1 tablet by mouth At Night As Needed for Muscle Spasms.  Dispense: 14 tablet; Refill: 0    --take medications as prescribed  --increase fluids, rest as needed, tylenol or ibuprofen for pain  --f/u in 5-7 days if no improvement; f/u with PCP for further refills        FOLLOW-UP  As discussed during visit with PCP/[facility] if no improvement or Urgent Care/Emergency Department if worsening of symptoms    Patient verbalizes understanding of medication dosage, comfort measures, instructions for treatment and follow-up.    Gaby De Leon, APRN  08/12/2024  11:29 EDT    The use of a video visit has been reviewed with the patient and verbal informed consent has been obtained. Myself and Fatuma Barnes participated in this visit. The patient is located in [place] [address].    I am located in [place], [place]. "[employer] and [employer] were utilized. I spent 8 minutes in the patient's chart for this visit.      Note Disclaimer: At [facility], we believe that sharing information builds trust and better   relationships. You are receiving this note because you recently visited [facility]. It is possible you   will see health information before a provider has talked with you about it. This kind of information can   be easy to misunderstand. To help you fully understand what it means for your health, we urge you to   discuss this note with your provider.

## 2024-09-05 ENCOUNTER — TELEPHONE (OUTPATIENT)
Dept: FAMILY MEDICINE CLINIC | Facility: CLINIC | Age: 39
End: 2024-09-05

## 2024-09-05 NOTE — TELEPHONE ENCOUNTER
Caller: Fatuma Barnes    Relationship: Self    Best call back number: [phone]    What medication are you requesting: NICOTINE PATCHES    What are your current symptoms: PT STATED THAT SHE VAPES    How long have you been experiencing symptoms: 6 YEARS VAPING AND CIGARETTE SMOKER 10 OR MORE YEARS BEFORE THAT    If a prescription is needed, what is your preferred pharmacy and phone number:    [id number] - [address] [phone] [facility] [phone]

## 2024-09-06 ENCOUNTER — SPECIALTY PHARMACY (OUTPATIENT)
Dept: PHARMACY | Facility: HOSPITAL | Age: 39
End: 2024-09-06
Payer: COMMERCIAL

## 2024-09-06 VITALS
SYSTOLIC BLOOD PRESSURE: 142 MMHG | DIASTOLIC BLOOD PRESSURE: 83 MMHG | HEART RATE: 72 BPM | WEIGHT: 226.5 LBS | TEMPERATURE: 98.7 F | OXYGEN SATURATION: 97 % | BODY MASS INDEX: 34.44 KG/M2

## 2024-09-06 DIAGNOSIS — Z86.19 HISTORY OF HEPATITIS C VIRUS INFECTION: Primary | ICD-10-CM

## 2024-09-06 DIAGNOSIS — E66.9 CLASS 1 OBESITY WITH BODY MASS INDEX (BMI) OF 34.0 TO 34.9 IN ADULT, UNSPECIFIED OBESITY TYPE, UNSPECIFIED WHETHER SERIOUS COMORBIDITY PRESENT: ICD-10-CM

## 2024-09-06 PROCEDURE — G0463 HOSPITAL OUTPT CLINIC VISIT: HCPCS

## 2024-09-06 RX ORDER — MULTIVITAMIN WITH IRON
1 TABLET ORAL DAILY
COMMUNITY

## 2024-09-06 NOTE — PROGRESS NOTES
New Patient Consult      Date: 2024   Patient Name: Fatuma Barnes  MRN: [id number]  : 1985     Primary Care Provider: Caitlyn Degroot APRN  Referring Provider: Zane    Chief Complaint   Patient presents with    Hepatitis C     Pt here for initial Hep C eval     History of Present Illness: Fatuma Barnes is a 39 y.o. female who is here today as a consultation with Gastroenterology for evaluation of Hepatitis C.    She found out about having Hepatitis C infection approx  14  years ago. She has not had prior treatment for hepatitis. Was told in the past that she did not have an active infection but she does not know much about this. She had Hep A infection while in rehab in 2018, she had nausea, vomiting and diarrhea at that time. There is no known family history of liver disease or cirrhosis. 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 She does have nonprofessional tattoos. Denies having previous alcoholism. She does currently drink alcohol, in small amounts rarely. She denies  current illicit drug use including marijuana. She stopped smoking marijuana a few days ago. No recent liver imaging. She has had recent labs. She has had previous vaccinations for Hepatitis B (she thinks). She is currently in school, working toward a degree in substance abuse counseling.     Feeling well today without GI complaints.     Subjective      Past Medical History:   Diagnosis Date    Depression     GERD (gastroesophageal reflux disease)     Gestational diabetes     Hepatitis C     History of transfusion     Kidney stones     Postpartum hemorrhage 2019    Preeclampsia      Past Surgical History:   Procedure Laterality Date    D & C AND LAPAROSCOPY      KIDNEY STONE SURGERY       Family History   Problem Relation Age of Onset    Hypertension Mother     Hypertension Father      Social History     Socioeconomic History    Marital status:    Tobacco Use     Smoking status: Every Day     Types: Electronic Cigarette     Passive exposure: Never    Smokeless tobacco: Never    Tobacco comments:     100 vapes per day   Vaping Use    Vaping status: Every Day    Substances: Nicotine    Devices: Disposable   Substance and Sexual Activity    Alcohol use: No    Drug use: Not Currently     Types: Marijuana     Comment: Hx of heroin, percocet---> subutex---> clean 19 months    Sexual activity: Yes     Partners: Male     Birth control/protection: None     Current Outpatient Medications:     B Complex-C (B-complex with vitamin C) tablet, Take 1 tablet by mouth Daily., Disp: , Rfl:     buprenorphine-naloxone (SUBOXONE) 8-2 MG film film, DISSOLVE 2 FILMS UNDER THE TONGUE EVERY DAY, Disp: , Rfl:     desvenlafaxine (Pristiq) 50 MG 24 hr tablet, Take 1 tablet by mouth Daily., Disp: 30 tablet, Rfl: 2    ketoconazole (NIZORAL) 2 % shampoo, Apply  topically to the appropriate area as directed 2 (Two) Times a Week., Disp: 120 mL, Rfl: 5    tiZANidine (ZANAFLEX) 4 MG tablet, Take 1 tablet by mouth At Night As Needed for Muscle Spasms., Disp: 14 tablet, Rfl: 0    MAGNESIUM PO, Take 2 capsules by mouth Every Night., Disp: , Rfl:     Allergies   Allergen Reactions    Penicillins Rash     The following portions of the patient's history were reviewed and updated as appropriate: allergies, current medications, past family history, past medical history, past social history, past surgical history and problem list.    Objective     Physical Exam  Constitutional:       General: She is not in acute distress.     Appearance: Normal appearance. She is well-developed. She is obese. She is not diaphoretic.   HENT:      Head: Normocephalic and atraumatic.      Right Ear: External ear normal.      Left Ear: External ear normal.      Nose: Nose normal.   Eyes:      General: No scleral icterus.        Right eye: No discharge.         Left eye: No discharge.      Conjunctiva/sclera: Conjunctivae normal.   Neck:       Trachea: No tracheal deviation.   Pulmonary:      Effort: Pulmonary effort is normal. No respiratory distress.   Musculoskeletal:         General: Normal range of motion.      Cervical back: Normal range of motion.   Skin:     Coloration: Skin is not pale.      Findings: No erythema or rash.   Neurological:      Mental Status: She is alert and oriented to person, place, and time.      Coordination: Coordination normal.   Psychiatric:         Mood and Affect: Mood normal.         Behavior: Behavior normal.         Thought Content: Thought content normal.         Judgment: Judgment normal.       Vitals:    09/06/24 1147   BP: 142/83   Pulse: 72   Temp: 98.7 °F (37.1 °C)   SpO2: 97%   Weight: 103 kg (226 lb 8 oz)     Results Review:   I have reviewed the patient's new clinical and imaging results.    Office Visit on 07/26/2024   Component Date Value Ref Range Status    Glucose 07/26/2024 87  65 - 99 mg/dL Final    BUN 07/26/2024 13  6 - 20 mg/dL Final    Creatinine 07/26/2024 0.59  0.57 - 1.00 mg/dL Final    EGFR Result 07/26/2024 117.0  >60.0 mL/min/1.73 Final    Comment: GFR Normal >60  Chronic Kidney Disease <60  Kidney Failure <15      BUN/Creatinine Ratio 07/26/2024 22.0  7.0 - 25.0 Final    Sodium 07/26/2024 139  136 - 145 mmol/L Final    Potassium 07/26/2024 4.2  3.5 - 5.2 mmol/L Final    Chloride 07/26/2024 102  98 - 107 mmol/L Final    Total CO2 07/26/2024 27.3  22.0 - 29.0 mmol/L Final    Calcium 07/26/2024 9.1  8.6 - 10.5 mg/dL Final    Total Protein 07/26/2024 7.0  6.0 - 8.5 g/dL Final    Albumin 07/26/2024 4.5  3.5 - 5.2 g/dL Final    Globulin 07/26/2024 2.5  gm/dL Final    A/G Ratio 07/26/2024 1.8  g/dL Final    Total Bilirubin 07/26/2024 0.3  0.0 - 1.2 mg/dL Final    Alkaline Phosphatase 07/26/2024 90  39 - 117 U/L Final    AST (SGOT) 07/26/2024 19  1 - 32 U/L Final    ALT (SGPT) 07/26/2024 16  1 - 33 U/L Final    25 Hydroxy, Vitamin D 07/26/2024 30.1  30.0 - 100.0 ng/mL Final    Comment: Reference  Range for Total Vitamin D 25(OH)  Deficiency <20.0 ng/mL  Insufficiency 21-29 ng/mL  Sufficiency  ng/mL  Toxicity >100 ng/ml      TSH 07/26/2024 1.260  0.270 - 4.200 uIU/mL Final    Free T4 07/26/2024 1.12  0.92 - 1.68 ng/dL Final    Hemoglobin A1C 07/26/2024 5.40  4.80 - 5.60 % Final    Comment: Hemoglobin A1C Ranges:  Increased Risk for Diabetes  5.7% to 6.4%  Diabetes                     >= 6.5%  Diabetic Goal                < 7.0%      Hep A IgM 07/26/2024 Negative  Negative Final    Hepatitis B Surface Ag 07/26/2024 Negative  Negative Final    Hep B Core IgM 07/26/2024 Negative  Negative Final    Hepatitis C Ab 07/26/2024 Reactive (A)  Non Reactive Final    HCV, RNA, QUANT 07/31/2024 HCV Not Detected  IU/mL Final      No radiology results for the last 90 days.     Assessment / Plan      1. History of hepatitis C virus infection  2. Class 1 obesity with body mass index (BMI) of 34.0 to 34.9 in adult, unspecified obesity type, unspecified whether serious comorbidity present  She found out about having Hepatitis C infection approx 14 years ago, no prior Hep C treatment. Hep C antibody was positive for many years. HCV RNA quant not detected 7/2024. Liver enzymes normal. No recent abdominal imaging. She has cleared Hep C infection and will not require any treatment. She will always have positive Hep C antibody due to prior infection. She is not immune to contract Hep C infection in the future if she continues any risky behaviors. Does have obesity with BMI 34. At risk for development of fatty liver.     No Hep C treatment indicated  No further GI follow up needed at this time  Continue to abstain from illicit drug use  Work on weight loss, Mediterranean diet recommended      Follow Up:   Return if symptoms worsen or fail to improve. She will call GI clinic if needed.     Jackie Gonzalez PA-C  [facility]  9/13/2024  15:00 EDT    Dictated Utilizing Dragon Dictation: Part of this note may be an  electronic transcription/translation of spoken language to printed text using the Dragon Dictation System.

## 2024-09-06 NOTE — LETTER
2024     YANCY Everett  [address] [place] [address]    Patient: Fatuma Barnes   YOB: 1985   Date of Visit: 2024       Dear YANCY Everett,    Thank you for referring Fatuma Barnes to me for evaluation. Below is a copy of my consult note.    If you have questions, please do not hesitate to call me. I look forward to following Fatuma along with you.         Sincerely,        [facility]        CC: No Recipients         New Patient Consult      Date: 2024   Patient Name: Fatuma Barnes  MRN: [id number]  : 1985     Primary Care Provider: Caitlyn Degroot APRN  Referring Provider: Zane    Chief Complaint   Patient presents with   • Hepatitis C     Pt here for initial Hep C eval     History of Present Illness: Fatuma Barnes is a 39 y.o. female who is here today as a consultation with Gastroenterology for evaluation of Hepatitis C.    She found out about having Hepatitis C infection approx  14  years ago. She has not had prior treatment for hepatitis. Was told in the past that she did not have an active infection but she does not know much about this. She had Hep A infection while in rehab in 2018, she had nausea, vomiting and diarrhea at that time. There is no known family history of liver disease or cirrhosis. 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 She does have nonprofessional tattoos. Denies having previous alcoholism. She does currently drink alcohol, in small amounts rarely. She denies  current illicit drug use including marijuana. She stopped smoking marijuana a few days ago. No recent liver imaging. She has had recent labs. She has had previous vaccinations for Hepatitis B (she thinks). She is currently in school, working toward a degree in substance abuse counseling.     Feeling well today without GI complaints.     Subjective      Past Medical History:   Diagnosis Date   •  Depression    • GERD (gastroesophageal reflux disease)    • Gestational diabetes    • Hepatitis C    • History of transfusion    • Kidney stones    • Postpartum hemorrhage 06/16/2019   • Preeclampsia      Past Surgical History:   Procedure Laterality Date   • D & C AND LAPAROSCOPY     • KIDNEY STONE SURGERY       Family History   Problem Relation Age of Onset   • Hypertension Mother    • Hypertension Father      Social History     Socioeconomic History   • Marital status:    Tobacco Use   • Smoking status: Every Day     Types: Electronic Cigarette     Passive exposure: Never   • Smokeless tobacco: Never   • Tobacco comments:     100 vapes per day   Vaping Use   • Vaping status: Every Day   • Substances: Nicotine   • Devices: Disposable   Substance and Sexual Activity   • Alcohol use: No   • Drug use: Not Currently     Types: Marijuana     Comment: Hx of heroin, percocet---> subutex---> clean 19 months   • Sexual activity: Yes     Partners: Male     Birth control/protection: None     Current Outpatient Medications:   •  B Complex-C (B-complex with vitamin C) tablet, Take 1 tablet by mouth Daily., Disp: , Rfl:   •  buprenorphine-naloxone (SUBOXONE) 8-2 MG film film, DISSOLVE 2 FILMS UNDER THE TONGUE EVERY DAY, Disp: , Rfl:   •  desvenlafaxine (Pristiq) 50 MG 24 hr tablet, Take 1 tablet by mouth Daily., Disp: 30 tablet, Rfl: 2  •  ketoconazole (NIZORAL) 2 % shampoo, Apply  topically to the appropriate area as directed 2 (Two) Times a Week., Disp: 120 mL, Rfl: 5  •  tiZANidine (ZANAFLEX) 4 MG tablet, Take 1 tablet by mouth At Night As Needed for Muscle Spasms., Disp: 14 tablet, Rfl: 0  •  MAGNESIUM PO, Take 2 capsules by mouth Every Night., Disp: , Rfl:     Allergies   Allergen Reactions   • Penicillins Rash     The following portions of the patient's history were reviewed and updated as appropriate: allergies, current medications, past family history, past medical history, past social history, past surgical  history and problem list.    Objective     Physical Exam  Constitutional:       General: She is not in acute distress.     Appearance: Normal appearance. She is well-developed. She is obese. She is not diaphoretic.   HENT:      Head: Normocephalic and atraumatic.      Right Ear: External ear normal.      Left Ear: External ear normal.      Nose: Nose normal.   Eyes:      General: No scleral icterus.        Right eye: No discharge.         Left eye: No discharge.      Conjunctiva/sclera: Conjunctivae normal.   Neck:      Trachea: No tracheal deviation.   Pulmonary:      Effort: Pulmonary effort is normal. No respiratory distress.   Musculoskeletal:         General: Normal range of motion.      Cervical back: Normal range of motion.   Skin:     Coloration: Skin is not pale.      Findings: No erythema or rash.   Neurological:      Mental Status: She is alert and oriented to person, place, and time.      Coordination: Coordination normal.   Psychiatric:         Mood and Affect: Mood normal.         Behavior: Behavior normal.         Thought Content: Thought content normal.         Judgment: Judgment normal.       Vitals:    09/06/24 1147   BP: 142/83   Pulse: 72   Temp: 98.7 °F (37.1 °C)   SpO2: 97%   Weight: 103 kg (226 lb 8 oz)     Results Review:   I have reviewed the patient's new clinical and imaging results.    Office Visit on 07/26/2024   Component Date Value Ref Range Status   • Glucose 07/26/2024 87  65 - 99 mg/dL Final   • BUN 07/26/2024 13  6 - 20 mg/dL Final   • Creatinine 07/26/2024 0.59  0.57 - 1.00 mg/dL Final   • EGFR Result 07/26/2024 117.0  >60.0 mL/min/1.73 Final    Comment: GFR Normal >60  Chronic Kidney Disease <60  Kidney Failure <15     • BUN/Creatinine Ratio 07/26/2024 22.0  7.0 - 25.0 Final   • Sodium 07/26/2024 139  136 - 145 mmol/L Final   • Potassium 07/26/2024 4.2  3.5 - 5.2 mmol/L Final   • Chloride 07/26/2024 102  98 - 107 mmol/L Final   • Total CO2 07/26/2024 27.3  22.0 - 29.0 mmol/L  Final   • Calcium 07/26/2024 9.1  8.6 - 10.5 mg/dL Final   • Total Protein 07/26/2024 7.0  6.0 - 8.5 g/dL Final   • Albumin 07/26/2024 4.5  3.5 - 5.2 g/dL Final   • Globulin 07/26/2024 2.5  gm/dL Final   • A/G Ratio 07/26/2024 1.8  g/dL Final   • Total Bilirubin 07/26/2024 0.3  0.0 - 1.2 mg/dL Final   • Alkaline Phosphatase 07/26/2024 90  39 - 117 U/L Final   • AST (SGOT) 07/26/2024 19  1 - 32 U/L Final   • ALT (SGPT) 07/26/2024 16  1 - 33 U/L Final   • 25 Hydroxy, Vitamin D 07/26/2024 30.1  30.0 - 100.0 ng/mL Final    Comment: Reference Range for Total Vitamin D 25(OH)  Deficiency <20.0 ng/mL  Insufficiency 21-29 ng/mL  Sufficiency  ng/mL  Toxicity >100 ng/ml     • TSH 07/26/2024 1.260  0.270 - 4.200 uIU/mL Final   • Free T4 07/26/2024 1.12  0.92 - 1.68 ng/dL Final   • Hemoglobin A1C 07/26/2024 5.40  4.80 - 5.60 % Final    Comment: Hemoglobin A1C Ranges:  Increased Risk for Diabetes  5.7% to 6.4%  Diabetes                     >= 6.5%  Diabetic Goal                < 7.0%     • Hep A IgM 07/26/2024 Negative  Negative Final   • Hepatitis B Surface Ag 07/26/2024 Negative  Negative Final   • Hep B Core IgM 07/26/2024 Negative  Negative Final   • Hepatitis C Ab 07/26/2024 Reactive (A)  Non Reactive Final   • HCV, RNA, QUANT 07/31/2024 HCV Not Detected  IU/mL Final      No radiology results for the last 90 days.     Assessment / Plan      1. History of hepatitis C virus infection  2. Class 1 obesity with body mass index (BMI) of 34.0 to 34.9 in adult, unspecified obesity type, unspecified whether serious comorbidity present  She found out about having Hepatitis C infection approx 14 years ago, no prior Hep C treatment. Hep C antibody was positive for many years. HCV RNA quant not detected 7/2024. Liver enzymes normal. No recent abdominal imaging. She has cleared Hep C infection and will not require any treatment. She will always have positive Hep C antibody due to prior infection. She is not immune to contract Hep C  infection in the future if she continues any risky behaviors. Does have obesity with BMI 34. At risk for development of fatty liver.     No Hep C treatment indicated  No further GI follow up needed at this time  Continue to abstain from illicit drug use  Work on weight loss, Mediterranean diet recommended      Follow Up:   Return if symptoms worsen or fail to improve. She will call GI clinic if needed.     Jackie Gonzalez PA-C  [facility]  9/13/2024  15:00 EDT    Dictated Utilizing Dragon Dictation: Part of this note may be an electronic transcription/translation of spoken language to printed text using the Dragon Dictation System.

## 2024-09-10 DIAGNOSIS — Z72.0 VAPES NICOTINE CONTAINING SUBSTANCE: Primary | ICD-10-CM

## 2024-09-10 RX ORDER — NICOTINE 21-14-7MG
1 KIT TRANSDERMAL DAILY
Qty: 56 EACH | Refills: 0 | Status: SHIPPED | OUTPATIENT
Start: 2024-09-10

## 2024-09-25 ENCOUNTER — OFFICE VISIT (OUTPATIENT)
Dept: BEHAVIORAL HEALTH | Facility: CLINIC | Age: 39
End: 2024-09-25
Payer: COMMERCIAL

## 2024-09-25 VITALS
SYSTOLIC BLOOD PRESSURE: 124 MMHG | BODY MASS INDEX: 35.01 KG/M2 | OXYGEN SATURATION: 99 % | WEIGHT: 231 LBS | HEIGHT: 68 IN | HEART RATE: 70 BPM | DIASTOLIC BLOOD PRESSURE: 88 MMHG

## 2024-09-25 DIAGNOSIS — F41.1 GENERALIZED ANXIETY DISORDER: ICD-10-CM

## 2024-09-25 DIAGNOSIS — F33.1 MAJOR DEPRESSIVE DISORDER, RECURRENT EPISODE, MODERATE: ICD-10-CM

## 2024-09-25 DIAGNOSIS — F90.2 ATTENTION DEFICIT HYPERACTIVITY DISORDER, COMBINED TYPE: Primary | ICD-10-CM

## 2024-09-25 DIAGNOSIS — Z51.81 ENCOUNTER FOR THERAPEUTIC DRUG MONITORING: ICD-10-CM

## 2024-09-25 PROCEDURE — 90792 PSYCH DIAG EVAL W/MED SRVCS: CPT

## 2024-09-25 PROCEDURE — 1160F RVW MEDS BY RX/DR IN RCRD: CPT

## 2024-09-25 PROCEDURE — 1159F MED LIST DOCD IN RCRD: CPT

## 2024-09-25 RX ORDER — LISDEXAMFETAMINE DIMESYLATE 30 MG/1
30 CAPSULE ORAL EVERY MORNING
Qty: 30 CAPSULE | Refills: 0 | Status: SHIPPED | OUTPATIENT
Start: 2024-09-25

## 2024-09-25 RX ORDER — FLUOXETINE 10 MG/1
CAPSULE ORAL
Qty: 60 CAPSULE | Refills: 1 | Status: SHIPPED | OUTPATIENT
Start: 2024-09-25

## 2024-09-25 RX ORDER — DESVENLAFAXINE 25 MG/1
TABLET, EXTENDED RELEASE ORAL
Qty: 14 TABLET | Refills: 0 | Status: SHIPPED | OUTPATIENT
Start: 2024-09-25 | End: 2024-10-16

## 2024-10-01 LAB — DRUGS UR: NORMAL

## 2024-10-02 ENCOUNTER — TELEPHONE (OUTPATIENT)
Dept: BEHAVIORAL HEALTH | Facility: CLINIC | Age: 39
End: 2024-10-02
Payer: COMMERCIAL

## 2024-10-02 NOTE — TELEPHONE ENCOUNTER
Left message for patient to call back stop the Vyvanse. See how the weaning off Pristiq has been and how starting the Prozac has gone. If it is going well we can increase the Prozac.

## 2024-10-02 NOTE — TELEPHONE ENCOUNTER
Patient does not like how the vyvanse makes her feel. She feels like it is more her nerves than the adhd and wants to try something more for her nerves

## 2024-10-02 NOTE — TELEPHONE ENCOUNTER
Have her stop the Vyvanse. See how the weaning off Pristiq has been and how starting the Prozac has gone. If it is going well we can increase the Prozac.

## 2024-10-18 DIAGNOSIS — F90.2 ATTENTION DEFICIT HYPERACTIVITY DISORDER, COMBINED TYPE: ICD-10-CM

## 2024-10-18 RX ORDER — LISDEXAMFETAMINE DIMESYLATE 30 MG/1
30 CAPSULE ORAL EVERY MORNING
Qty: 30 CAPSULE | Refills: 0 | Status: CANCELLED | OUTPATIENT
Start: 2024-10-18

## 2024-10-18 NOTE — TELEPHONE ENCOUNTER
PATIENT SAYS SHE WAS STARTED ON THE LOWER DOSE OF VYVANSE. SHE SAYS IT DIDN'T SEEM LIKE IT WAS HELPING HER AT ALL, SO SHE STARTED TAKING 2 PILLS PER DAY TO SEE IF THEY MADE A DIFFERENCE. SHE SAYS THEY WORK GREAT FOR HER AND WANTS TO KNOW IF SHE CAN BE INCREASED TO 2 PER DAY. SHE SAYS SHE IS ALSO ABOUT TO RUN OUT AND WOULD LIKE A NEW SCRIPT SENT FOR THE HIGHER DOSAGE.

## 2024-10-21 RX ORDER — LISDEXAMFETAMINE DIMESYLATE 50 MG/1
50 CAPSULE ORAL EVERY MORNING
Qty: 30 CAPSULE | Refills: 0 | Status: SHIPPED | OUTPATIENT
Start: 2024-10-21

## 2024-11-06 ENCOUNTER — TELEMEDICINE (OUTPATIENT)
Dept: BEHAVIORAL HEALTH | Facility: CLINIC | Age: 39
End: 2024-11-06
Payer: COMMERCIAL

## 2024-11-06 DIAGNOSIS — F41.1 GENERALIZED ANXIETY DISORDER: Primary | ICD-10-CM

## 2024-11-06 DIAGNOSIS — F90.2 ATTENTION DEFICIT HYPERACTIVITY DISORDER, COMBINED TYPE: ICD-10-CM

## 2024-11-06 DIAGNOSIS — F33.1 MAJOR DEPRESSIVE DISORDER, RECURRENT EPISODE, MODERATE: ICD-10-CM

## 2024-11-06 PROCEDURE — 1159F MED LIST DOCD IN RCRD: CPT

## 2024-11-06 PROCEDURE — 1160F RVW MEDS BY RX/DR IN RCRD: CPT

## 2024-11-06 PROCEDURE — 99214 OFFICE O/P EST MOD 30 MIN: CPT

## 2024-11-06 RX ORDER — FLUOXETINE 40 MG/1
40 CAPSULE ORAL DAILY
Qty: 30 CAPSULE | Refills: 1 | Status: SHIPPED | OUTPATIENT
Start: 2024-11-06

## 2024-11-06 NOTE — PROGRESS NOTES
This provider is located at [facility], Behavioral Health, [address] [place] [address], [facility],using a secure MyChart Video Visit through [employer]. Patient is being seen remotely via telehealth at their home address in [place], and stated they are in a secure environment for this session. The patient's condition being diagnosed/treated is appropriate for telemedicine. The provider identified herself as well as her credentials.   The patient, and/or patients guardian, consent to be seen remotely, and when consent is given they understand that the consent allows for patient identifiable information to be sent to a third party as needed.   They may refuse to be seen remotely at any time. The electronic data is encrypted and password protected, and the patient and/or guardian has been advised of the potential risks to privacy not withstanding such measures.    Video Visit    Patient Name: Fatuma Barnes  : 1985   MRN: [id number]   Care Team: Patient Care Team:  Caitlyn Degroot APRN as PCP - General (Family Medicine)  Dennise Sanderson DO (Obstetrics and Gynecology)  Gina Bundy APRN as Nurse Practitioner (Behavioral Health)         Chief Complaint:    Chief Complaint   Patient presents with    ADHD    Anxiety    Depression    Med Management       History of Present Illness: Fatuma Barnes is a 39 y.o. female who presents via video visit for a medication management follow up. Patient reports that increasing the Vyvanse has been very helpful and her focus and concentration have been doing well. She states she has weaned off the Pristiq and started the Prozac. She denies any adverse effects of the Prozac, however, she has been having quite a bit of anxiety since switching over. She denies SI/HI today.     The following portion of the patient's history were reviewed and updated appropriately: allergies, current and past medications, family history, medical history and social  history. NERI reviewed and appropriate.   Subjective   Review of Systems:    Review of Systems   Respiratory: Negative.     Cardiovascular: Negative.  Negative for chest pain and palpitations.   Neurological: Negative.    Psychiatric/Behavioral:  Positive for stress. The patient is nervous/anxious.    All other systems reviewed and are negative.      Current Medications:   Current Outpatient Medications   Medication Sig Dispense Refill    buprenorphine-naloxone (SUBOXONE) 8-2 MG film film DISSOLVE 2 FILMS UNDER THE TONGUE EVERY DAY      FLUoxetine (PROzac) 40 MG capsule Take 1 capsule by mouth Daily. 30 capsule 1    lisdexamfetamine (Vyvanse) 50 MG capsule Take 1 capsule by mouth Every Morning 30 capsule 0    Nicotine 21-14-7 MG/24HR kit Place 1 patch on the skin as directed by provider Daily. 56 each 0    tiZANidine (ZANAFLEX) 4 MG tablet Take 1 tablet by mouth At Night As Needed for Muscle Spasms. 14 tablet 0     No current facility-administered medications for this visit.       Mental Status Exam:   Hygiene:    unable to assess   Cooperation:  Cooperative  Eye Contact:  Good  Psychomotor Behavior:   appears to be WNL   Affect:  Appropriate  Mood: anxious  Speech:  Normal  Thought Process:  Goal directed and Linear  Thought Content:  Normal and Mood congruent  Suicidal:  None  Homicidal:  None  Hallucinations:  None  Delusion:  None  Memory:  Intact  Orientation:  Person, Place, Time, and Situation  Reliability:  good  Insight:  Good  Judgement:  Good  Impulse Control:  Good  Physical/Medical Issues:  Yes see chart       Objective   Vital Signs:   There were no vitals taken for this visit.      On 9/25/2024  BP: 124/88  P: 70    Assessment / Plan    Diagnoses and all orders for this visit:    1. Generalized anxiety disorder (Primary)  -     FLUoxetine (PROzac) 40 MG capsule; Take 1 capsule by mouth Daily.  Dispense: 30 capsule; Refill: 1    2. Major depressive disorder, recurrent episode, moderate  -      FLUoxetine (PROzac) 40 MG capsule; Take 1 capsule by mouth Daily.  Dispense: 30 capsule; Refill: 1    3. Attention deficit hyperactivity disorder, combined type    Will increase Prozac and continue Vyvanse as ordered.     As part of patient's treatment plan I am prescribing a controlled substance.  The patient has been made aware of the appropriate use of such medications and potential for dependence and/or overdose.  It has also been made clear that these medications are for use by this patient only, without concomitant use of alcohol or other substances, unless prescribed.    Patient has completed a prescribing agreement detailing terms of continued prescribing of controlled substances, including monitoring NERI reports, urine drug screening, and pill counts if necessary.  Patient is aware that inappropriate use will result in cessation of prescribing such medications.    A psychological evaluation was conducted in order to assess past and current level of functioning. Areas assessed included, but were not limited to: perception of social support, perception of ability to face and deal with challenges in life (positive functioning), anxiety symptoms, depressive symptoms, perspective on beliefs/belief system, coping skills for stress, intelligence level,  Therapeutic rapport was established. Interventions conducted today were geared towards incorporating medication management along with support for continued therapy. Education was also provided as to the med management with this provider and what to expect in subsequent sessions.      We discussed risks, benefits, goals and side effects of the above medication and the patient was agreeable with the plan. Patient was educated on the importance of compliance with treatment and follow-up appointments. Patient is aware to contact the [facility] with any worsening of symptoms. To call for questions or concerns and return early if necessary. Patent is agreeable to  go to the Emergency Department or call 911 should they begin SI/HI.        MEDS ORDERED DURING VISIT:  New Medications Ordered This Visit   Medications    FLUoxetine (PROzac) 40 MG capsule     Sig: Take 1 capsule by mouth Daily.     Dispense:  30 capsule     Refill:  1         Follow Up   Return in about 6 weeks (around 12/18/2024).  Patient was given instructions and counseling regarding her condition or for health maintenance advice. Please see specific information pulled into the AVS if appropriate.     TREATMENT PLAN/GOALS: Continue supportive psychotherapy efforts and medications as indicated. Treatment and medication options discussed during today's visit. Patient acknowledged and verbally consented to continue with current treatment plan and was educated on the importance of compliance with treatment and follow-up appointments.    MEDICATION ISSUES:  Discussed medication options and treatment plan of prescribed medication as well as the risks, benefits, and side effects including potential falls, possible impaired driving and metabolic adversities among others. Patient is agreeable to call the office with any worsening of symptoms or onset of side effects. Patient is agreeable to call 911 or go to the nearest ER should he/she begin having SI/HI.        [facility]  [address] [place] [address] DR ALEXANDRA PEMBERTON [id number] [phone]    November 6, 2024 15:33 EST

## 2024-11-14 DIAGNOSIS — F90.2 ATTENTION DEFICIT HYPERACTIVITY DISORDER, COMBINED TYPE: ICD-10-CM

## 2024-11-14 RX ORDER — LISDEXAMFETAMINE DIMESYLATE 50 MG/1
50 CAPSULE ORAL EVERY MORNING
Qty: 30 CAPSULE | Refills: 0 | Status: SHIPPED | OUTPATIENT
Start: 2024-11-14

## 2024-11-14 NOTE — TELEPHONE ENCOUNTER
Rx Refill Note  Requested Prescriptions     Pending Prescriptions Disp Refills    lisdexamfetamine (Vyvanse) 50 MG capsule 30 capsule 0     Sig: Take 1 capsule by mouth Every Morning      Last office visit with prescribing clinician: 9/25/2024   Last telemedicine visit with prescribing clinician: 11/6/2024   Next office visit with prescribing clinician: 12/16/2024                         Would you like a call back once the refill request has been completed: [] Yes [] No    If the office needs to give you a call back, can they leave a voicemail: [] Yes [] No    Magda Euceda MA  11/14/24, 10:18 EST

## 2024-12-16 ENCOUNTER — TELEMEDICINE (OUTPATIENT)
Dept: BEHAVIORAL HEALTH | Facility: CLINIC | Age: 39
End: 2024-12-16
Payer: COMMERCIAL

## 2024-12-16 DIAGNOSIS — F33.1 MAJOR DEPRESSIVE DISORDER, RECURRENT EPISODE, MODERATE: ICD-10-CM

## 2024-12-16 DIAGNOSIS — F90.2 ATTENTION DEFICIT HYPERACTIVITY DISORDER, COMBINED TYPE: Primary | ICD-10-CM

## 2024-12-16 DIAGNOSIS — F41.1 GENERALIZED ANXIETY DISORDER: ICD-10-CM

## 2024-12-16 PROCEDURE — 1160F RVW MEDS BY RX/DR IN RCRD: CPT

## 2024-12-16 PROCEDURE — 99214 OFFICE O/P EST MOD 30 MIN: CPT

## 2024-12-16 PROCEDURE — 1159F MED LIST DOCD IN RCRD: CPT

## 2024-12-16 RX ORDER — LISDEXAMFETAMINE DIMESYLATE 50 MG/1
50 CAPSULE ORAL EVERY MORNING
Qty: 30 CAPSULE | Refills: 0 | Status: SHIPPED | OUTPATIENT
Start: 2024-12-16

## 2024-12-16 RX ORDER — FLUOXETINE 40 MG/1
40 CAPSULE ORAL DAILY
Qty: 90 CAPSULE | Refills: 0 | Status: SHIPPED | OUTPATIENT
Start: 2024-12-16

## 2024-12-16 NOTE — PROGRESS NOTES
This provider is located at [facility], Behavioral Health, [address] [place] [address], [facility],using a secure MyChart Video Visit through [employer]. Patient is being seen remotely via telehealth at their home address in [place], and stated they are in a secure environment for this session. The patient's condition being diagnosed/treated is appropriate for telemedicine. The provider identified herself as well as her credentials.   The patient, and/or patients guardian, consent to be seen remotely, and when consent is given they understand that the consent allows for patient identifiable information to be sent to a third party as needed.   They may refuse to be seen remotely at any time. The electronic data is encrypted and password protected, and the patient and/or guardian has been advised of the potential risks to privacy not withstanding such measures.    Video Visit    Patient Name: Fatuma Barnes  : 1985   MRN: [id number]   Care Team: Patient Care Team:  Caitlyn Degroot APRN as PCP - General (Family Medicine)  Dennise Sanderson DO (Obstetrics and Gynecology)  Gina Bundy APRN as Nurse Practitioner (Behavioral Health)         Chief Complaint:    Chief Complaint   Patient presents with    ADHD    Anxiety    Depression    Med Management       History of Present Illness: Fatuma Barnes is a 39 y.o. female who presents via video visit for a medication management follow up. Patient reports that increasing the Prozac has been helpful and things are going really well. She feels that her focus and concentration are doing good and her mood and anxiety are managed well. She did not see the need to make any changes and did not have any medication concerns at today's visit. She denies SI/HI today.     The following portion of the patient's history were reviewed and updated appropriately: allergies, current and past medications, family history, medical history and social history.  NERI reviewed and appropriate.   Subjective   Review of Systems:    Review of Systems   Respiratory: Negative.     Cardiovascular: Negative.  Negative for chest pain and palpitations.   Neurological: Negative.    Psychiatric/Behavioral: Negative.     All other systems reviewed and are negative.      Current Medications:   Current Outpatient Medications   Medication Sig Dispense Refill    FLUoxetine (PROzac) 40 MG capsule Take 1 capsule by mouth Daily. 90 capsule 0    lisdexamfetamine (Vyvanse) 50 MG capsule Take 1 capsule by mouth Every Morning 30 capsule 0    buprenorphine-naloxone (SUBOXONE) 8-2 MG film film DISSOLVE 2 FILMS UNDER THE TONGUE EVERY DAY       No current facility-administered medications for this visit.       Mental Status Exam:   Hygiene:    appears to be WNL   Cooperation:  Cooperative  Eye Contact:  Good  Psychomotor Behavior:   appears to be WNL   Affect:  Appropriate  Mood: normal  Speech:  Normal  Thought Process:  Goal directed and Linear  Thought Content:  Normal and Mood congruent  Suicidal:  None  Homicidal:  None  Hallucinations:  None  Delusion:  None  Memory:  Intact  Orientation:  Person, Place, Time, and Situation  Reliability:  good  Insight:  Good  Judgement:  Good  Impulse Control:  Good  Physical/Medical Issues:  Yes see chart       Objective   Vital Signs:   There were no vitals taken for this visit.      On 9/25/2024  BP: 124/88  P: 70      Lab Results:   Office Visit on 09/25/2024   Component Date Value Ref Range Status    Report Summary 09/25/2024 FINAL   Final    Comment: ====================================================================  [facility] COMP DRUG ANALYSIS,UR  ====================================================================  Test                             Result       Flag       Units  Drug Present and Declared for Prescription Verification    Buprenorphine                  119          EXPECTED   ng/mg creat    Norbuprenorphine               465           EXPECTED   ng/mg creat     Source of buprenorphine is a scheduled prescription medication.     Norbuprenorphine is an expected metabolite of buprenorphine.    Desmethylvenlafaxine           PRESENT      EXPECTED     Desmethylvenlafaxine may be present due to administration of     desvenlafaxine; it is also an expected metabolite of venlafaxine.  Drug Present not Declared for Prescription Verification    Carboxy-THC                    883          UNEXPECTED ng/mg creat     Carboxy-THC is a metabolite of tetrahydrocannabinol (THC). Source     of THC is most commonly herbal marijuana o                           r marijuana-based     products, but THC is also present in a scheduled prescription     medication. Trace amounts of THC can be present in hemp and     cannabidiol (CBD) products. This test is not intended to     distinguish between delta-9-tetrahydrocannabinol, the predominant     form of THC in most herbal or marijuana-based products, and     delta-8-tetrahydrocannabinol.    Acetaminophen                  PRESENT      UNEXPECTED    Ibuprofen                      PRESENT      UNEXPECTED  Drug Absent but Declared for Prescription Verification    Amphetamine                    Not Detected UNEXPECTED ng/mg creat    Tizanidine                     Not Detected UNEXPECTED     Tizanidine, as indicated in the declared medication list, is not     always detected even when used as directed.    Fluoxetine                     Not Detected UNEXPECTED  ====================================================================  Test                      Result    Flag   Units      Ref Range    Creatinine                                         101              mg/dL      >=20  ====================================================================  Declared Medications:   The flagging and interpretation on this report are based on the   following declared medications.  Unexpected results may arise from   inaccuracies in the declared  medications.   **Note: The testing scope of this panel includes these medications:   Amphetamine (Vyvanse)   Buprenorphine. (Suboxone)   Desvenlafaxine   Fluoxetine   **Note: The testing scope of this panel does not include small to   moderate amounts of these reported medications:   Tizanidine   **Note: The testing scope of this panel does not include following   reported medications:   B Complex   Magnesium   Naloxone (Suboxone)   Nicotine PATCH   Topical Ketoconazole   Vitamin C  ====================================================================  For clinical consultation, please call [phone].  ====================================================================        Assessment / Plan    Diagnoses and all orders for this visit:    1. Attention deficit hyperactivity disorder, combined type (Primary)  -     lisdexamfetamine (Vyvanse) 50 MG capsule; Take 1 capsule by mouth Every Morning  Dispense: 30 capsule; Refill: 0    2. Generalized anxiety disorder  -     FLUoxetine (PROzac) 40 MG capsule; Take 1 capsule by mouth Daily.  Dispense: 90 capsule; Refill: 0    3. Major depressive disorder, recurrent episode, moderate  -     FLUoxetine (PROzac) 40 MG capsule; Take 1 capsule by mouth Daily.  Dispense: 90 capsule; Refill: 0    Patient appears to be doing well on current medication. No issues reported and no indication for change. Will continue medication as ordered.     As part of patient's treatment plan I am prescribing a controlled substance.  The patient has been made aware of the appropriate use of such medications and potential for dependence and/or overdose.  It has also been made clear that these medications are for use by this patient only, without concomitant use of alcohol or other substances, unless prescribed.    Patient has completed a prescribing agreement detailing terms of continued prescribing of controlled substances, including monitoring NERI reports, urine drug screening, and pill  counts if necessary.  Patient is aware that inappropriate use will result in cessation of prescribing such medications.    A psychological evaluation was conducted in order to assess past and current level of functioning. Areas assessed included, but were not limited to: perception of social support, perception of ability to face and deal with challenges in life (positive functioning), anxiety symptoms, depressive symptoms, perspective on beliefs/belief system, coping skills for stress, intelligence level,  Therapeutic rapport was established. Interventions conducted today were geared towards incorporating medication management along with support for continued therapy. Education was also provided as to the med management with this provider and what to expect in subsequent sessions.      We discussed risks, benefits, goals and side effects of the above medication and the patient was agreeable with the plan. Patient was educated on the importance of compliance with treatment and follow-up appointments. Patient is aware to contact the [facility] with any worsening of symptoms. To call for questions or concerns and return early if necessary. Patent is agreeable to go to the Emergency Department or call 911 should they begin SI/HI.        MEDS ORDERED DURING VISIT:  New Medications Ordered This Visit   Medications    FLUoxetine (PROzac) 40 MG capsule     Sig: Take 1 capsule by mouth Daily.     Dispense:  90 capsule     Refill:  0    lisdexamfetamine (Vyvanse) 50 MG capsule     Sig: Take 1 capsule by mouth Every Morning     Dispense:  30 capsule     Refill:  0         Follow Up   Return in about 3 months (around 3/16/2025).  Patient was given instructions and counseling regarding her condition or for health maintenance advice. Please see specific information pulled into the AVS if appropriate.     TREATMENT PLAN/GOALS: Continue supportive psychotherapy efforts and medications as indicated. Treatment and medication  options discussed during today's visit. Patient acknowledged and verbally consented to continue with current treatment plan and was educated on the importance of compliance with treatment and follow-up appointments.    MEDICATION ISSUES:  Discussed medication options and treatment plan of prescribed medication as well as the risks, benefits, and side effects including potential falls, possible impaired driving and metabolic adversities among others. Patient is agreeable to call the office with any worsening of symptoms or onset of side effects. Patient is agreeable to call 911 or go to the nearest ER should he/she begin having SI/HI.        YANCY [facility]  [facility] [address] [place] [id number] [phone]    December 16, 2024 13:11 EST

## 2025-01-14 DIAGNOSIS — F90.2 ATTENTION DEFICIT HYPERACTIVITY DISORDER, COMBINED TYPE: ICD-10-CM

## 2025-01-14 RX ORDER — LISDEXAMFETAMINE DIMESYLATE 50 MG/1
50 CAPSULE ORAL EVERY MORNING
Qty: 30 CAPSULE | Refills: 0 | Status: SHIPPED | OUTPATIENT
Start: 2025-01-14

## 2025-01-23 ENCOUNTER — OFFICE VISIT (OUTPATIENT)
Dept: FAMILY MEDICINE CLINIC | Facility: CLINIC | Age: 40
End: 2025-01-23
Payer: COMMERCIAL

## 2025-01-23 VITALS
SYSTOLIC BLOOD PRESSURE: 186 MMHG | HEART RATE: 98 BPM | BODY MASS INDEX: 32.28 KG/M2 | WEIGHT: 213 LBS | HEIGHT: 68 IN | RESPIRATION RATE: 16 BRPM | OXYGEN SATURATION: 97 % | DIASTOLIC BLOOD PRESSURE: 100 MMHG

## 2025-01-23 DIAGNOSIS — N81.10 BLADDER PROLAPSE, FEMALE, ACQUIRED: ICD-10-CM

## 2025-01-23 DIAGNOSIS — F41.9 ANXIETY: ICD-10-CM

## 2025-01-23 DIAGNOSIS — F32.A DEPRESSION, UNSPECIFIED DEPRESSION TYPE: ICD-10-CM

## 2025-01-23 DIAGNOSIS — I10 HYPERTENSION, UNSPECIFIED TYPE: ICD-10-CM

## 2025-01-23 DIAGNOSIS — R00.2 PALPITATION: Primary | ICD-10-CM

## 2025-01-23 DIAGNOSIS — Z98.890 STATUS POST BLADDER REPAIR: ICD-10-CM

## 2025-01-23 PROCEDURE — 99214 OFFICE O/P EST MOD 30 MIN: CPT | Performed by: NURSE PRACTITIONER

## 2025-01-23 PROCEDURE — 1160F RVW MEDS BY RX/DR IN RCRD: CPT | Performed by: NURSE PRACTITIONER

## 2025-01-23 PROCEDURE — 1159F MED LIST DOCD IN RCRD: CPT | Performed by: NURSE PRACTITIONER

## 2025-01-23 PROCEDURE — 1126F AMNT PAIN NOTED NONE PRSNT: CPT | Performed by: NURSE PRACTITIONER

## 2025-01-23 RX ORDER — BUSPIRONE HYDROCHLORIDE 5 MG/1
5 TABLET ORAL 2 TIMES DAILY
Qty: 60 TABLET | Refills: 2 | Status: SHIPPED | OUTPATIENT
Start: 2025-01-23

## 2025-01-23 RX ORDER — CLONIDINE HYDROCHLORIDE 0.2 MG/1
0.2 TABLET ORAL 2 TIMES DAILY
COMMUNITY
End: 2025-01-23

## 2025-01-23 RX ORDER — PROPRANOLOL HYDROCHLORIDE 60 MG/1
60 CAPSULE, EXTENDED RELEASE ORAL DAILY
Qty: 30 CAPSULE | Refills: 1 | Status: SHIPPED | OUTPATIENT
Start: 2025-01-23

## 2025-01-23 RX ORDER — MINOCYCLINE HYDROCHLORIDE 100 MG/1
100 CAPSULE ORAL 2 TIMES DAILY
COMMUNITY

## 2025-01-23 NOTE — PROGRESS NOTES
"                      Established Patient        Chief Complaint:   Chief Complaint   Patient presents with    Bladder Problem     Pt is here for a referral to Urologist.     Acne     Pt wants a referral to dermatology.    Hypertension     Pt has been having high blood pressure. Pt has been taking clonidine 2 mg of husbands 2 x times daily       History of Present Illness:    Fatuma Barnes is a 39 y.o. female who presents today for concerns of elevated BP.     Only change in medication is changed to sublocade injection on 1/7.     Does not want to take Vyvanse--does not like the way she feels when taking the medication--increases her anxiety    Currently taking Prozac 40mg daily    Had bladder pinning with OB/GYN in [place]--feels like it has come \"unpinned\" and now feels like she is being \"poked\" inside when she bends over.     Subjective     The following portions of the patient's history were reviewed and updated as appropriate: allergies, current medications, past family history, past medical history, past social history, past surgical history and problem list.    ALLERGIES  Allergies   Allergen Reactions    Penicillins Rash       Review of Systems  Gen- No fevers, chills  HEENT--No runny nose, congestion, sore throat, ear pain  CV- No chest pain, +palpitations  Resp- No cough, dyspnea  GI- No N/V/D, abd pain  -No dysuria, flank pain, difficulty urinating  Musc-No tenderness, swelling, decreased ROM  Neuro-No dizziness, headaches  Psych-No SI/HI, sleep disturbance, +anxiety    Objective     Vital Signs:   BP (!) 186/100   Pulse 98   Resp 16   Ht 172.7 cm (68\")   Wt 96.6 kg (213 lb)   SpO2 97%   BMI 32.39 kg/m²                Physical Exam   Physical Exam  Vitals and nursing note reviewed.   HENT:      Mouth/Throat:      Lips: Pink.   Eyes:      General: Lids are normal.   Cardiovascular:      Rate and Rhythm: Normal rate and regular rhythm.      Heart sounds: Normal heart sounds.   Pulmonary:      " Effort: Pulmonary effort is normal.      Breath sounds: Normal breath sounds.   Neurological:      Mental Status: She is alert and oriented to person, place, and time.      Gait: Gait is intact.   Psychiatric:         Attention and Perception: Attention normal.         Mood and Affect: Mood and affect normal.         Speech: Speech normal.         Behavior: Behavior normal. Behavior is cooperative.       ECG 12 Lead    Date/Time: 2/10/2025 2:30 PM  Performed by: Caitlyn Degroot APRN    Authorized by: Caitlyn Degroot APRN  Comparison: not compared with previous ECG   Rhythm: sinus bradycardia  Rate: bradycardic  BPM: 59    Clinical impression: normal ECG and non-specific ECG            Assessment and Plan      Assessment/Plan:   Diagnoses and all orders for this visit:    1. Palpitation (Primary)  -     ECG 12 Lead    2. Anxiety  -     propranolol LA (Inderal LA) 60 MG 24 hr capsule; Take 1 capsule by mouth Daily.  Dispense: 30 capsule; Refill: 1  -     busPIRone (BUSPAR) 5 MG tablet; Take 1 tablet by mouth 2 (Two) Times a Day.  Dispense: 60 tablet; Refill: 2    3. Depression, unspecified depression type    4. Hypertension, unspecified type    5. Bladder prolapse, female, acquired  -     Ambulatory Referral to Obstetrics / Gynecology    6. Status post bladder repair  -     Ambulatory Referral to Obstetrics / Gynecology    Risks, benefits, and potential side effects of current/new medications reviewed with patient.  Patient voiced understanding and wished to proceed with treatment.     Discussed need for stress/anxiety reducing techniques such as prayer/meditation/breathing and counting exercises and avoidance of stress producing environments/situations; will follow clinically.    Discussed stimulant vs nonstimulant options for ADHD treatment. Patient wishes to try nonstimulant option due to history of addiction and reports that she is not tolerative vyvanse. Patient to return to office for follow up appointment  in 4 weeks to follow up on BP and discuss nonstimulant option.     Patient was encouraged to keep me informed of any acute changes, lack of improvement, or any new concerning symptoms.    Discussion Summary:  Discussed plan of care in detail with pt today; pt verb understanding and agrees.        I have reviewed and updated all copied forward information, as appropriate.  I attest to the accuracy and relevance of any unchanged information.      Follow up:  No follow-ups on file.     Patient Education:  There are no Patient Instructions on file for this visit.    YANCY Everett  02/10/25  14:30 EST          Please note that portions of this note may have been completed with a voice recognition program.

## 2025-02-10 ENCOUNTER — TELEPHONE (OUTPATIENT)
Dept: FAMILY MEDICINE CLINIC | Facility: CLINIC | Age: 40
End: 2025-02-10
Payer: COMMERCIAL

## 2025-02-10 DIAGNOSIS — F90.0 ATTENTION DEFICIT HYPERACTIVITY DISORDER (ADHD), PREDOMINANTLY INATTENTIVE TYPE: Primary | ICD-10-CM

## 2025-02-10 PROCEDURE — 93000 ELECTROCARDIOGRAM COMPLETE: CPT | Performed by: NURSE PRACTITIONER

## 2025-02-10 RX ORDER — LISDEXAMFETAMINE DIMESYLATE 50 MG/1
50 CAPSULE ORAL EVERY MORNING
Qty: 30 CAPSULE | Refills: 0 | Status: SHIPPED | OUTPATIENT
Start: 2025-02-10

## 2025-02-10 NOTE — TELEPHONE ENCOUNTER
Called patient and she states she is still taking the Vyvanse. Patient reported to Caitlyn that she did not want to take it anymore, she thought the Vyvanse was giving her anxiety and hypertension. Patient explained to me that she has just recently switch from Suboxone to Sublocade, and that is what was making her feel the way she was feeling. Patient is still wanting to take the Vyvanse and only has 3 days left.

## 2025-02-10 NOTE — TELEPHONE ENCOUNTER
Reason being was she thought the vyvanse was making her BP and heart rate high, she later found out that it was a different medicine.

## 2025-02-27 ENCOUNTER — OFFICE VISIT (OUTPATIENT)
Dept: FAMILY MEDICINE CLINIC | Facility: CLINIC | Age: 40
End: 2025-02-27
Payer: COMMERCIAL

## 2025-02-27 VITALS
BODY MASS INDEX: 31.13 KG/M2 | RESPIRATION RATE: 16 BRPM | OXYGEN SATURATION: 97 % | HEIGHT: 68 IN | HEART RATE: 88 BPM | WEIGHT: 205.4 LBS | SYSTOLIC BLOOD PRESSURE: 138 MMHG | DIASTOLIC BLOOD PRESSURE: 84 MMHG

## 2025-02-27 DIAGNOSIS — R03.0 ELEVATED BLOOD PRESSURE READING IN OFFICE WITHOUT DIAGNOSIS OF HYPERTENSION: ICD-10-CM

## 2025-02-27 DIAGNOSIS — R00.2 PALPITATION: Primary | ICD-10-CM

## 2025-02-27 DIAGNOSIS — F11.11 HISTORY OF OPIOID ABUSE: ICD-10-CM

## 2025-02-27 DIAGNOSIS — R00.1 SINUS BRADYCARDIA: ICD-10-CM

## 2025-02-27 DIAGNOSIS — L70.0 CYSTIC ACNE: ICD-10-CM

## 2025-02-27 NOTE — PROGRESS NOTES
"                      Established Patient        Chief Complaint:   Chief Complaint   Patient presents with    Hypertension         History of Present Illness:    Fatuma Barnes is a 40 y.o. female who presents today for follow up of HTN    BP remains elevated. Patient reports that she took clonidine this morning after checking her BP    Has been receiving sublocade injections for 2 months. Reports that she feels \"terrible\". Trouble sleeping, decreased appetite, elevated BP. Reports that the sublocade only seems to be working for about 2 weeks and causes extreme nausea. Has been contemplating returning to suboxone clinic as she did not experience any side effects when treated with daily dosing.     Denies chest pain, severe HA, confusion, visual disturbance, dizziness. Reports intermittent palpitations and SOA.     Requesting referral to dermatology for treatment of cystic acne. Reports trying and failing multiple topical therapies in the past as well as spironolactone and most recently minocycline.    Subjective     The following portions of the patient's history were reviewed and updated as appropriate: allergies, current medications, past family history, past medical history, past social history, past surgical history and problem list.    ALLERGIES  Allergies   Allergen Reactions    Penicillins Rash       Review of Systems  Gen- No fevers, chills, +decreased appetite, +weight loss  HEENT--No runny nose, congestion, sore throat, ear pain  CV- No chest pain, palpitations  Resp- No cough, dyspnea  GI- +nausea, No V/D, abd pain  -No dysuria, flank pain, difficulty urinating  Musc-No tenderness, swelling, decreased ROM  Neuro-No dizziness, headaches  Psych-No SI/HI, +sleep disturbance  Skin-+acne lesions of face  Objective     Vital Signs:   /84   Pulse 88   Resp 16   Ht 172.7 cm (68\")   Wt 93.2 kg (205 lb 6.4 oz)   SpO2 97%   BMI 31.23 kg/m²                Physical Exam   Physical Exam  Vitals and " nursing note reviewed.   HENT:      Mouth/Throat:      Lips: Pink.   Eyes:      General: Lids are normal.   Neck:      Vascular: No JVD.   Cardiovascular:      Rate and Rhythm: Normal rate and regular rhythm.   Pulmonary:      Effort: Pulmonary effort is normal.      Breath sounds: Normal breath sounds.   Skin:     Findings: Acne and erythema present.   Neurological:      Mental Status: She is alert and oriented to person, place, and time.      Gait: Gait is intact.   Psychiatric:         Attention and Perception: Attention normal.         Mood and Affect: Mood and affect normal.         Speech: Speech normal.         Behavior: Behavior normal. Behavior is cooperative.       ECG 12 Lead    Date/Time: 2/27/2025 1:05 PM  Performed by: Caitlyn Degroot APRN    Authorized by: Caitlyn Degroot APRN  Comparison: not compared with previous ECG   Rhythm: sinus bradycardia  Rate: bradycardic  BPM: 48    Clinical impression: non-specific ECG            Assessment and Plan      Assessment/Plan:   Diagnoses and all orders for this visit:    1. Palpitation (Primary)  -     ECG 12 Lead    2. Cystic acne  -     Ambulatory Referral to Dermatology    3. Elevated blood pressure reading in office without diagnosis of hypertension    4. History of opioid abuse    5. Sinus bradycardia    EKG notes sinus bradycardia with HR 48bpm. Results discussed with patient at time of appointment.     Discussed with patient options to return to oral suboxone clinic.     Patient reports that she plans to contact clinic to discontinue sublocade.    Discussed need for reduction in sodium/salt/caffeine intake; improve sleep habits as able; inc formal CV exercise program with goal of vigorous activity most, if not all, days of the week.     Ambulatory blood pressure monitoring encouraged prior to taking medication daily and as needed.    Contact office for symptomatic HTN or consistently uncontrolled HTN.     Patient to increase dietary intake of  vegetables, fruits, nuts, whole grains and fish. Decrease dietary intake of carbs, processed foods such as lunch meats, saturated fats, sugary beverages.     Increase exercise regimen as tolerated toward a goal of 120-150 minutes/week.     Patient to present to ED for chest pain, confusion, vision disturbance.     Patient was encouraged to keep me informed of any acute changes, lack of improvement, or any new concerning symptoms.    Discussion Summary:  Discussed plan of care in detail with pt today; pt verb understanding and agrees.    I spent 30 minutes caring for Fatuma on this date of service. This time includes time spent by me in the following activities:preparing for the visit, reviewing tests, obtaining and/or reviewing a separately obtained history, performing a medically appropriate examination and/or evaluation , counseling and educating the patient/family/caregiver, ordering medications, tests, or procedures, referring and communicating with other health care professionals , and documenting information in the medical record    I confirm accuracy of unchanged data/findings which have been carried forward from previous visit, as well as I have updated appropriately those that have changed.        I have reviewed and updated all copied forward information, as appropriate.  I attest to the accuracy and relevance of any unchanged information.      Follow up:  Return in about 1 month (around 3/27/2025).     Patient Education:  There are no Patient Instructions on file for this visit.    YANCY Everett  03/17/25  00:15 EDT          Please note that portions of this note may have been completed with a voice recognition program.

## 2025-03-04 DIAGNOSIS — F90.0 ATTENTION DEFICIT HYPERACTIVITY DISORDER (ADHD), PREDOMINANTLY INATTENTIVE TYPE: ICD-10-CM

## 2025-03-04 RX ORDER — LISDEXAMFETAMINE DIMESYLATE 50 MG/1
50 CAPSULE ORAL EVERY MORNING
Qty: 30 CAPSULE | Refills: 0 | Status: SHIPPED | OUTPATIENT
Start: 2025-03-04

## 2025-03-04 NOTE — TELEPHONE ENCOUNTER
Rx Refill Note  Requested Prescriptions     Pending Prescriptions Disp Refills    minocycline (MINOCIN,DYNACIN) 100 MG capsule       Sig: Take 1 capsule by mouth 2 (Two) Times a Day.      Last office visit with prescribing clinician: 2/27/2025   Last telemedicine visit with prescribing clinician: Visit date not found   Next office visit with prescribing clinician: 3/27/2025                         Would you like a call back once the refill request has been completed: [] Yes [] No    If the office needs to give you a call back, can they leave a voicemail: [] Yes [] No    Magda Euceda MA  03/04/25, 14:17 EST

## 2025-03-06 RX ORDER — MINOCYCLINE HYDROCHLORIDE 100 MG/1
100 CAPSULE ORAL 2 TIMES DAILY
Qty: 60 CAPSULE | Refills: 1 | Status: SHIPPED | OUTPATIENT
Start: 2025-03-06

## 2025-03-17 ENCOUNTER — TELEMEDICINE (OUTPATIENT)
Dept: BEHAVIORAL HEALTH | Facility: CLINIC | Age: 40
End: 2025-03-17
Payer: COMMERCIAL

## 2025-03-17 DIAGNOSIS — F90.2 ATTENTION DEFICIT HYPERACTIVITY DISORDER, COMBINED TYPE: Primary | ICD-10-CM

## 2025-03-17 DIAGNOSIS — F41.1 GENERALIZED ANXIETY DISORDER: ICD-10-CM

## 2025-03-17 DIAGNOSIS — F33.1 MAJOR DEPRESSIVE DISORDER, RECURRENT EPISODE, MODERATE: ICD-10-CM

## 2025-03-17 PROCEDURE — 1159F MED LIST DOCD IN RCRD: CPT

## 2025-03-17 PROCEDURE — 99214 OFFICE O/P EST MOD 30 MIN: CPT

## 2025-03-17 PROCEDURE — 1160F RVW MEDS BY RX/DR IN RCRD: CPT

## 2025-03-17 RX ORDER — FLUOXETINE HYDROCHLORIDE 40 MG/1
40 CAPSULE ORAL DAILY
Qty: 90 CAPSULE | Refills: 0 | Status: SHIPPED | OUTPATIENT
Start: 2025-03-17

## 2025-03-17 NOTE — PROGRESS NOTES
This provider is located at [facility], Behavioral Health, [address] [place] [address], [facility],using a secure MyChart Video Visit through [employer]. Patient is being seen remotely via telehealth at their home address in [place], and stated they are in a secure environment for this session. The patient's condition being diagnosed/treated is appropriate for telemedicine. The provider identified herself as well as her credentials.   The patient, and/or patients guardian, consent to be seen remotely, and when consent is given they understand that the consent allows for patient identifiable information to be sent to a third party as needed.   They may refuse to be seen remotely at any time. The electronic data is encrypted and password protected, and the patient and/or guardian has been advised of the potential risks to privacy not withstanding such measures.    Video Visit    Patient Name: Fatuma Barnes  : 1985   MRN: [id number]   Care Team: Patient Care Team:  Caitlyn Degroot APRN as PCP - General (Family Medicine)  Dennise Sanderson DO (Obstetrics and Gynecology)  Gina Bundy APRN as Nurse Practitioner (Behavioral Health)         Chief Complaint:    Chief Complaint   Patient presents with    ADHD    Anxiety    Depression    Med Management       History of Present Illness: Fatuma Barnes is a 40 y.o. female who presents via video visit for a medication management follow up.  Patient reports that overall medication continues to be effective.  She feels that her focus and concentration are doing good and her mood and anxiety have been managed well.  Endorses some situational stressors, however, she feels that she is managing them well. She did not see the need to make any changes and did not have any medication concerns at today's visit. She denies SI/HI today.     The following portion of the patient's history were reviewed and updated appropriately: allergies, current and  past medications, family history, medical history and social history. NERI reviewed and appropriate.   Subjective   Review of Systems:    Review of Systems   Respiratory: Negative.     Cardiovascular: Negative.  Negative for chest pain and palpitations.   Neurological: Negative.    Psychiatric/Behavioral: Negative.     All other systems reviewed and are negative.    Current Medications:   Current Outpatient Medications   Medication Sig Dispense Refill    FLUoxetine (PROzac) 40 MG capsule Take 1 capsule by mouth Daily. 90 capsule 0    busPIRone (BUSPAR) 5 MG tablet Take 1 tablet by mouth 2 (Two) Times a Day. 60 tablet 2    lisdexamfetamine (Vyvanse) 50 MG capsule Take 1 capsule by mouth Every Morning 30 capsule 0    minocycline (MINOCIN,DYNACIN) 100 MG capsule Take 1 capsule by mouth 2 (Two) Times a Day. 60 capsule 1    propranolol LA (Inderal LA) 60 MG 24 hr capsule Take 1 capsule by mouth Daily. 30 capsule 1     No current facility-administered medications for this visit.       Mental Status Exam:   Hygiene:    appears to be WNL  Cooperation:  Cooperative  Eye Contact:  Good  Psychomotor Behavior:   Appears to be WNL  Affect:  Appropriate  Mood: normal  Speech:  Normal  Thought Process:  Goal directed and Linear  Thought Content:  Normal and Mood congruent  Suicidal:  None  Homicidal:  None  Hallucinations:  None  Delusion:  None  Memory:  Intact  Orientation:  Person, Place, Time, and Situation  Reliability:  good  Insight:  Good  Judgement:  Good  Impulse Control:  Good  Physical/Medical Issues:  Yes see chart       Objective   Vital Signs:   There were no vitals taken for this visit.         BP Readings from Last 3 Encounters:   02/27/25 138/84   01/23/25 (!) 186/100   09/25/24 124/88        Pulse Readings from Last 3 Encounters:   02/27/25 88   01/23/25 98   09/25/24 70        Lab Results:     Lab Results   Component Value Date    WBC 12.85 (H) 06/23/2019    HGB 8.6 (L) 06/23/2019    HCT 27.8 (L) 06/23/2019     MCV 87.1 06/23/2019     (H) 06/23/2019         Lab Results   Component Value Date    GLUCOSE 87 07/26/2024    BUN 13 07/26/2024    CREATININE 0.59 07/26/2024     07/26/2024    K 4.2 07/26/2024     07/26/2024    CALCIUM 9.1 07/26/2024    PROTEINTOT 7.0 07/26/2024    ALBUMIN 4.5 07/26/2024    ALT 16 07/26/2024    AST 19 07/26/2024    ALKPHOS 90 07/26/2024    BILITOT 0.3 07/26/2024    GLOB 2.5 07/26/2024    AGRATIO 1.8 07/26/2024    BCR 22.0 07/26/2024    ANIONGAP 14.0 06/23/2019    EGFR 117.0 07/26/2024         Lab Results   Component Value Date    HGBA1C 5.40 07/26/2024          Lab Results   Component Value Date    TSH 1.260 07/26/2024         Assessment / Plan    Diagnoses and all orders for this visit:    1. Attention deficit hyperactivity disorder, combined type (Primary)    2. Generalized anxiety disorder  -     FLUoxetine (PROzac) 40 MG capsule; Take 1 capsule by mouth Daily.  Dispense: 90 capsule; Refill: 0    3. Major depressive disorder, recurrent episode, moderate  -     FLUoxetine (PROzac) 40 MG capsule; Take 1 capsule by mouth Daily.  Dispense: 90 capsule; Refill: 0      Patient appears to be doing well on current medication. No issues reported and no indication for change. Will continue medication as ordered.     As part of patient's treatment plan I am prescribing a controlled substance.  The patient has been made aware of the appropriate use of such medications and potential for dependence and/or overdose.  It has also been made clear that these medications are for use by this patient only, without concomitant use of alcohol or other substances, unless prescribed.    Patient/guardian has completed a prescribing agreement detailing terms of continued prescribing of controlled substances, including monitoring NERI reports, urine drug screening, and pill counts if necessary.  Patient is aware that inappropriate use will result in cessation of prescribing such medications.    A  psychological evaluation was conducted in order to assess past and current level of functioning. Areas assessed included, but were not limited to: perception of social support, perception of ability to face and deal with challenges in life (positive functioning), anxiety symptoms, depressive symptoms, perspective on beliefs/belief system, coping skills for stress, intelligence level,  Therapeutic rapport was established. Interventions conducted today were geared towards incorporating medication management along with support for continued therapy. Education was also provided as to the med management with this provider and what to expect in subsequent sessions.      We discussed risks, benefits, goals and side effects of the above medication and the patient was agreeable with the plan. Patient was educated on the importance of compliance with treatment and follow-up appointments. Patient is aware to contact the [facility] with any worsening of symptoms. To call for questions or concerns and return early if necessary. Patent is agreeable to go to the Emergency Department or call 911 should they begin SI/HI.        MEDS ORDERED DURING VISIT:  New Medications Ordered This Visit   Medications    FLUoxetine (PROzac) 40 MG capsule     Sig: Take 1 capsule by mouth Daily.     Dispense:  90 capsule     Refill:  0         Follow Up   Return in about 3 months (around 6/17/2025).  Patient was given instructions and counseling regarding her condition or for health maintenance advice. Please see specific information pulled into the AVS if appropriate.     TREATMENT PLAN/GOALS: Continue supportive psychotherapy efforts and medications as indicated. Treatment and medication options discussed during today's visit. Patient acknowledged and verbally consented to continue with current treatment plan and was educated on the importance of compliance with treatment and follow-up appointments.    MEDICATION ISSUES:  Discussed medication  options and treatment plan of prescribed medication as well as the risks, benefits, and side effects including potential falls, possible impaired driving and metabolic adversities among others. Patient is agreeable to call the office with any worsening of symptoms or onset of side effects. Patient is agreeable to call 911 or go to the nearest ER should he/she begin having SI/HI.        [facility]  [address] [place] [address] [address] [place] [address] [phone]    March 18, 2025 11:49 EDT

## 2025-03-24 DIAGNOSIS — F41.9 ANXIETY: ICD-10-CM

## 2025-03-24 RX ORDER — PROPRANOLOL HYDROCHLORIDE 60 MG/1
60 CAPSULE, EXTENDED RELEASE ORAL DAILY
Qty: 30 CAPSULE | Refills: 1 | OUTPATIENT
Start: 2025-03-24

## 2025-03-24 RX ORDER — PROPRANOLOL HYDROCHLORIDE 60 MG/1
60 CAPSULE, EXTENDED RELEASE ORAL DAILY
Qty: 30 CAPSULE | Refills: 1 | Status: SHIPPED | OUTPATIENT
Start: 2025-03-24

## 2025-03-24 NOTE — TELEPHONE ENCOUNTER
Caller: [email or web address]    Relationship: Self    Best call back number: [phone]     Requested Prescriptions:   Requested Prescriptions     Pending Prescriptions Disp Refills    propranolol LA (Inderal LA) 60 MG 24 hr capsule 30 capsule 1     Sig: Take 1 capsule by mouth Daily.        Pharmacy where request should be sent: [id number] [address] [phone] [facility] [phone] FX     Last office visit with prescribing clinician: 2/27/2025   Last telemedicine visit with prescribing clinician: Visit date not found   Next office visit with prescribing clinician: 3/27/2025     Additional details provided by patient: PATIENT IS OUT.     Does the patient have less than a 3 day supply:  [x] Yes  [] No    Would you like a call back once the refill request has been completed: [] Yes [x] No    If the office needs to give you a call back, can they leave a voicemail: [] Yes [x] No    Cadance Dunaway, RegSched Rep   03/24/25 09:24 EDT

## 2025-04-07 DIAGNOSIS — F90.0 ATTENTION DEFICIT HYPERACTIVITY DISORDER (ADHD), PREDOMINANTLY INATTENTIVE TYPE: ICD-10-CM

## 2025-04-07 RX ORDER — LISDEXAMFETAMINE DIMESYLATE 50 MG/1
50 CAPSULE ORAL EVERY MORNING
Qty: 30 CAPSULE | Refills: 0 | Status: SHIPPED | OUTPATIENT
Start: 2025-04-07

## 2025-04-07 NOTE — TELEPHONE ENCOUNTER
Rx Refill Note  Requested Prescriptions     Pending Prescriptions Disp Refills    lisdexamfetamine (Vyvanse) 50 MG capsule 30 capsule 0     Sig: Take 1 capsule by mouth Every Morning      Last office visit with prescribing clinician: 9/25/2024   Last telemedicine visit with prescribing clinician: Visit date not found   Next office visit with prescribing clinician: 6/19/2025     Maureen Giang MA  04/07/25, 09:46 EDT

## 2025-04-10 ENCOUNTER — TELEPHONE (OUTPATIENT)
Dept: OBSTETRICS AND GYNECOLOGY | Facility: CLINIC | Age: 40
End: 2025-04-10

## 2025-04-10 NOTE — TELEPHONE ENCOUNTER
Caller: Fatuma Barnes    Relationship:  Self    Best call back number:[phone] (home)       PATIENT CALLED REQUESTING TO CANCEL SAME DAY APPT.    Did the patient call AFTER the start time of their scheduled appointment?  []YES  [x]NO    Was the patient's appointment rescheduled? [x]YES  []NO

## 2025-05-06 DIAGNOSIS — F41.9 ANXIETY: ICD-10-CM

## 2025-05-06 DIAGNOSIS — F90.0 ATTENTION DEFICIT HYPERACTIVITY DISORDER (ADHD), PREDOMINANTLY INATTENTIVE TYPE: ICD-10-CM

## 2025-05-06 RX ORDER — PROPRANOLOL HYDROCHLORIDE 60 MG/1
60 CAPSULE, EXTENDED RELEASE ORAL DAILY
Qty: 30 CAPSULE | Refills: 1 | Status: SHIPPED | OUTPATIENT
Start: 2025-05-06

## 2025-05-06 RX ORDER — LISDEXAMFETAMINE DIMESYLATE 50 MG/1
50 CAPSULE ORAL EVERY MORNING
Qty: 30 CAPSULE | Refills: 0 | Status: SHIPPED | OUTPATIENT
Start: 2025-05-06